# Patient Record
Sex: FEMALE | Race: WHITE | Employment: OTHER | URBAN - METROPOLITAN AREA
[De-identification: names, ages, dates, MRNs, and addresses within clinical notes are randomized per-mention and may not be internally consistent; named-entity substitution may affect disease eponyms.]

---

## 2024-03-19 ENCOUNTER — TELEPHONE (OUTPATIENT)
Dept: FAMILY MEDICINE CLINIC | Facility: CLINIC | Age: 69
End: 2024-03-19

## 2024-03-19 NOTE — TELEPHONE ENCOUNTER
Left hip revision on 04/03/24 with Dr. Aguero at [facility]    H&P- completed 03/20/24  Labs- RDW-SD (48.8), FBS (104), A1C (5.8), MRSA neg, BUN/Creat ratio (20.5),  CRP (1.70), all other labs WNL  EKG- Normal SR, low voltage QRS, consider pulmonary disease, pericardial effusion, or normal variant. Pt had a normal Lexiscan 10/17/22 in Care Everywhere  X-ray- Severe scoliotic curvature dorsal lumbar spine and multilevel spondylosis, otherwise WNL    ASA hold 7 days prior to surgery per JA

## 2024-03-20 ENCOUNTER — OFFICE VISIT (OUTPATIENT)
Dept: FAMILY MEDICINE CLINIC | Facility: CLINIC | Age: 69
End: 2024-03-20
Payer: MEDICARE

## 2024-03-20 ENCOUNTER — HOSPITAL ENCOUNTER (OUTPATIENT)
Dept: GENERAL RADIOLOGY | Facility: HOSPITAL | Age: 69
Discharge: HOME | End: 2024-03-20
Attending: FAMILY MEDICINE
Payer: MEDICARE

## 2024-03-20 ENCOUNTER — LAB ENCOUNTER (OUTPATIENT)
Dept: LAB | Facility: HOSPITAL | Age: 69
End: 2024-03-20
Attending: FAMILY MEDICINE
Payer: MEDICARE

## 2024-03-20 VITALS
SYSTOLIC BLOOD PRESSURE: 114 MMHG | HEIGHT: 65.5 IN | BODY MASS INDEX: 34.83 KG/M2 | TEMPERATURE: 98 F | DIASTOLIC BLOOD PRESSURE: 70 MMHG | RESPIRATION RATE: 16 BRPM | OXYGEN SATURATION: 98 % | HEART RATE: 73 BPM | WEIGHT: 211.63 LBS

## 2024-03-20 DIAGNOSIS — Z01.812 PRE-OPERATIVE LABORATORY EXAMINATION: ICD-10-CM

## 2024-03-20 DIAGNOSIS — Z01.818 PREOPERATIVE EXAMINATION, UNSPECIFIED: ICD-10-CM

## 2024-03-20 DIAGNOSIS — R73.01 ELEVATED FASTING BLOOD SUGAR: ICD-10-CM

## 2024-03-20 DIAGNOSIS — E78.00 HIGH CHOLESTEROL: ICD-10-CM

## 2024-03-20 DIAGNOSIS — Z01.818 PREOPERATIVE EXAMINATION, UNSPECIFIED: Primary | ICD-10-CM

## 2024-03-20 DIAGNOSIS — Z86.73 HISTORY OF CVA (CEREBROVASCULAR ACCIDENT): ICD-10-CM

## 2024-03-20 DIAGNOSIS — T84.011A FAILURE OF LEFT TOTAL HIP ARTHROPLASTY, INITIAL ENCOUNTER (HCC): ICD-10-CM

## 2024-03-20 DIAGNOSIS — K21.00 GASTROESOPHAGEAL REFLUX DISEASE WITH ESOPHAGITIS WITHOUT HEMORRHAGE: ICD-10-CM

## 2024-03-20 DIAGNOSIS — I10 PRIMARY HYPERTENSION: ICD-10-CM

## 2024-03-20 DIAGNOSIS — E66.9 OBESITY (BMI 30.0-34.9): ICD-10-CM

## 2024-03-20 DIAGNOSIS — G47.33 OSA ON CPAP: ICD-10-CM

## 2024-03-20 DIAGNOSIS — R73.03 PREDIABETES: ICD-10-CM

## 2024-03-20 LAB
ALBUMIN SERPL-MCNC: 4 G/DL (ref 3.2–4.8)
ALBUMIN/GLOB SERPL: 1.2 {RATIO} (ref 1–2)
ALP LIVER SERPL-CCNC: 72 U/L
ALT SERPL-CCNC: 24 U/L
ANION GAP SERPL CALC-SCNC: 5 MMOL/L (ref 0–18)
ANTIBODY SCREEN: NEGATIVE
AST SERPL-CCNC: 24 U/L (ref ?–34)
ATRIAL RATE: 64 BPM
BASOPHILS # BLD AUTO: 0.06 X10(3) UL (ref 0–0.2)
BASOPHILS NFR BLD AUTO: 0.9 %
BILIRUB SERPL-MCNC: 0.8 MG/DL (ref 0.2–1.1)
BILIRUB UR QL: NEGATIVE
BUN BLD-MCNC: 15 MG/DL (ref 9–23)
BUN/CREAT SERPL: 20.5 (ref 10–20)
CALCIUM BLD-MCNC: 9.6 MG/DL (ref 8.7–10.4)
CHLORIDE SERPL-SCNC: 107 MMOL/L (ref 98–112)
CLARITY UR: CLEAR
CO2 SERPL-SCNC: 30 MMOL/L (ref 21–32)
COLOR UR: YELLOW
CREAT BLD-MCNC: 0.73 MG/DL
CRP SERPL-MCNC: 1.7 MG/DL (ref ?–1)
DEPRECATED RDW RBC AUTO: 48 FL (ref 35.1–46.3)
EOSINOPHIL # BLD AUTO: 0.2 X10(3) UL (ref 0–0.7)
EOSINOPHIL NFR BLD AUTO: 3 %
ERYTHROCYTE [DISTWIDTH] IN BLOOD BY AUTOMATED COUNT: 13.4 % (ref 11–15)
ERYTHROCYTE [SEDIMENTATION RATE] IN BLOOD: 29 MM/HR
EST. AVERAGE GLUCOSE BLD GHB EST-MCNC: 120 MG/DL (ref 68–126)
FASTING STATUS PATIENT QL REPORTED: YES
GLOBULIN PLAS-MCNC: 3.4 G/DL (ref 2.8–4.4)
GLUCOSE BLD-MCNC: 104 MG/DL (ref 70–99)
GLUCOSE UR-MCNC: NORMAL MG/DL
HBA1C MFR BLD: 5.8 % (ref ?–5.7)
HCT VFR BLD AUTO: 45.8 %
HGB BLD-MCNC: 14.4 G/DL
HGB UR QL STRIP.AUTO: NEGATIVE
IMM GRANULOCYTES # BLD AUTO: 0.01 X10(3) UL (ref 0–1)
IMM GRANULOCYTES NFR BLD: 0.1 %
KETONES UR-MCNC: 10 MG/DL
LEUKOCYTE ESTERASE UR QL STRIP.AUTO: NEGATIVE
LYMPHOCYTES # BLD AUTO: 2.08 X10(3) UL (ref 1–4)
LYMPHOCYTES NFR BLD AUTO: 30.8 %
MCH RBC QN AUTO: 30.5 PG (ref 26–34)
MCHC RBC AUTO-ENTMCNC: 31.4 G/DL (ref 31–37)
MCV RBC AUTO: 97 FL
MONOCYTES # BLD AUTO: 0.47 X10(3) UL (ref 0.1–1)
MONOCYTES NFR BLD AUTO: 7 %
NEUTROPHILS # BLD AUTO: 3.94 X10 (3) UL (ref 1.5–7.7)
NEUTROPHILS # BLD AUTO: 3.94 X10(3) UL (ref 1.5–7.7)
NEUTROPHILS NFR BLD AUTO: 58.2 %
NITRITE UR QL STRIP.AUTO: NEGATIVE
OSMOLALITY SERPL CALC.SUM OF ELEC: 295 MOSM/KG (ref 275–295)
P AXIS: 28 DEGREES
P-R INTERVAL: 162 MS
PH UR: 6 [PH] (ref 5–8)
PLATELET # BLD AUTO: 275 10(3)UL (ref 150–450)
POTASSIUM SERPL-SCNC: 4.6 MMOL/L (ref 3.5–5.1)
PROT SERPL-MCNC: 7.4 G/DL (ref 5.7–8.2)
PROT UR-MCNC: NEGATIVE MG/DL
Q-T INTERVAL: 414 MS
QRS DURATION: 76 MS
QTC CALCULATION (BEZET): 427 MS
R AXIS: -3 DEGREES
RBC # BLD AUTO: 4.72 X10(6)UL
RH BLOOD TYPE: POSITIVE
RH BLOOD TYPE: POSITIVE
SODIUM SERPL-SCNC: 142 MMOL/L (ref 136–145)
SP GR UR STRIP: 1.03 (ref 1–1.03)
T AXIS: 16 DEGREES
UROBILINOGEN UR STRIP-ACNC: NORMAL
VENTRICULAR RATE: 64 BPM
WBC # BLD AUTO: 6.8 X10(3) UL (ref 4–11)

## 2024-03-20 PROCEDURE — 93005 ELECTROCARDIOGRAM TRACING: CPT

## 2024-03-20 PROCEDURE — 86901 BLOOD TYPING SEROLOGIC RH(D): CPT | Performed by: FAMILY MEDICINE

## 2024-03-20 PROCEDURE — 93010 ELECTROCARDIOGRAM REPORT: CPT | Performed by: INTERNAL MEDICINE

## 2024-03-20 PROCEDURE — 85025 COMPLETE CBC W/AUTO DIFF WBC: CPT

## 2024-03-20 PROCEDURE — 80053 COMPREHEN METABOLIC PANEL: CPT

## 2024-03-20 PROCEDURE — 86850 RBC ANTIBODY SCREEN: CPT | Performed by: FAMILY MEDICINE

## 2024-03-20 PROCEDURE — 85652 RBC SED RATE AUTOMATED: CPT

## 2024-03-20 PROCEDURE — 36415 COLL VENOUS BLD VENIPUNCTURE: CPT | Performed by: FAMILY MEDICINE

## 2024-03-20 PROCEDURE — 83036 HEMOGLOBIN GLYCOSYLATED A1C: CPT

## 2024-03-20 PROCEDURE — 81003 URINALYSIS AUTO W/O SCOPE: CPT

## 2024-03-20 PROCEDURE — 71046 X-RAY EXAM CHEST 2 VIEWS: CPT | Performed by: FAMILY MEDICINE

## 2024-03-20 PROCEDURE — 87081 CULTURE SCREEN ONLY: CPT

## 2024-03-20 PROCEDURE — 99203 OFFICE O/P NEW LOW 30 MIN: CPT | Performed by: FAMILY MEDICINE

## 2024-03-20 PROCEDURE — 86900 BLOOD TYPING SEROLOGIC ABO: CPT | Performed by: FAMILY MEDICINE

## 2024-03-20 PROCEDURE — 86140 C-REACTIVE PROTEIN: CPT

## 2024-03-20 RX ORDER — LISINOPRIL 5 MG/1
5 TABLET ORAL DAILY
COMMUNITY

## 2024-03-20 RX ORDER — ASPIRIN 81 MG/1
81 TABLET ORAL DAILY
COMMUNITY

## 2024-03-20 RX ORDER — FAMOTIDINE 40 MG/1
40 TABLET, FILM COATED ORAL DAILY
COMMUNITY

## 2024-03-20 NOTE — H&P (VIEW-ONLY)
[facility] PRE-OP CLINIC [place]    PRE-OP NOTE    HPI:   I have been consulted by Dr. Aguero to see Silvana Owusu 68 year old adult for a preoperative evaluation and medical clearance. 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 She is scheduled for left hip revision with Dr. Aguero on 4/3/24 at [facility].     Pt suffers significant pain and loss of function.     No cardiopulmonary symptoms.      No history of DVT. Denies tobacco use. She does have ARISTEO.       Family History   Problem Relation Age of Onset    Cancer Father         pancreatic    Diabetes Mother     Cancer Mother         small cell lung CA    Cancer Brother         prostate cancer and leukemia    Heart Disorder Brother       Current Outpatient Medications   Medication Sig Dispense Refill    aspirin 81 MG Oral Tab EC Take 1 tablet (81 mg total) by mouth daily.      famotidine 40 MG Oral Tab Take 1 tablet (40 mg total) by mouth daily.      lisinopril 5 MG Oral Tab Take 1 tablet (5 mg total) by mouth daily.       Past Medical History:   Diagnosis Date    Allergic rhinitis     Colon polyps     Congenital deformity of hip (HCC)     CVA (cerebral vascular accident) (HCC) 2009    silent stroke per Dr. Ny    Endometrial cancer (HCC) 2016    stage 1    Endometrial hyperplasia without atypia, complex     Essential hypertension     GERD (gastroesophageal reflux disease)     Hyperlipidemia     IBS (irritable bowel syndrome)     Insomnia     Liver hemangioma     Obesity     ARISTEO (obstructive sleep apnea)     uses CPAP    Osteoarthritis     Pancreatitis (HCC)     Prediabetes     Scoliosis     Sepsis (HCC) 2020    left leg cellulitis    UTI (urinary tract infection)     Visual impairment     wears glasses     Past Surgical History:   Procedure Laterality Date    CHOLECYSTECTOMY      COLONOSCOPY      2011    HERNIA REPAIR  2018    ventral    HIP REPLACEMENT SURGERY Bilateral     bilateral hip replacements in 2000, with bilateral  revision 2007    HYSTERECTOMY  2016    with bilateral salpingo-oophorectomy    OTHER SURGICAL HISTORY  2009    D&C     Social History     Socioeconomic History    Marital status:      Spouse name: Not on file    Number of children: Not on file    Years of education: Not on file    Highest education level: Not on file   Occupational History    Not on file   Tobacco Use    Smoking status: Former     Packs/day: 0.25     Years: 20.00     Additional pack years: 0.00     Total pack years: 5.00     Types: Cigarettes     Quit date:      Years since quittin.2     Passive exposure: Past    Smokeless tobacco: Never   Vaping Use    Vaping Use: Former   Substance and Sexual Activity    Alcohol use: Yes     Comment: rarely    Drug use: Never    Sexual activity: Not on file   Other Topics Concern    Not on file   Social History Narrative    Not on file     Social Determinants of Health     Financial Resource Strain: Not on file   Food Insecurity: Not on file   Transportation Needs: Not on file   Physical Activity: Not on file   Stress: Not on file   Social Connections: Not on file   Housing Stability: Not on file       REVIEW OF SYSTEMS:   CONSTITUTIONAL:  Denies unusual weight gain/loss, fever, chills  EENT:  Eyes:  Denies eye pain, visual loss, blurred vision, double vision. Ears, Nose, Throat:  Denies congestion, runny nose or sore throat.  INTEGUMENTARY:  Denies rashes, itching, skin lesion,   CARDIOVASCULAR:  Denies DVT. Denies chest pain, palpitations, edema, dyspnea  RESPIRATORY: she has ARISTEO on cpap ,Denies shortness of breath, wheezing, cough  GASTROINTESTINAL:  Denies abdominal pain, nausea, vomiting, constipation, diarrhea, or blood in stool.  MUSCULOSKELETAL: severe pain as noted above  NEUROLOGICAL:  Denies headache, seizures, dizziness, syncope, paralysis, ataxia,  HEMATOLOGIC:  Denies anemia, bleeding or bruising.  LYMPHATICS:  Denies enlarged nodes   PSYCHIATRIC:  Denies depression or  anxiety.  ENDOCRINOLOGIC: DM 2 she has prediabetes,   ALLERGIES:  Denies allergic response, history of asthma, hives,     EXAM:   /70 (BP Location: Left arm)   Pulse 73   Temp 97.9 °F (36.6 °C) (Temporal)   Resp 16   Ht 5' 5.5\" (1.664 m)   Wt 211 lb 9.6 oz (96 kg)   SpO2 98%   BMI 34.68 kg/m²  Estimated body mass index is 34.68 kg/m² as calculated from the following:    Height as of this encounter: 5' 5.5\" (1.664 m).    Weight as of this encounter: 211 lb 9.6 oz (96 kg).   Vital signs reviewed.Appears stated age, well groomed.  Physical Exam:  GEN:  Patient is alert, awake and oriented, well developed, well nourished, no apparent distress.  HEENT:  Head:  Normocephalic, atraumatic Eyes: EOMI,no scleral icterus, conjunctivae clear bilaterally, no eye discharge Nose: patent, no nasal discharge  NECK: Supple, no CLAD, no carotid bruit, no thyromegaly.  SKIN: No rashes, no skin lesion, no bruising, good turgor.  HEART:  Regular rate and rhythm, no murmurs, rubs or gallops.  LUNGS: Clear to auscultation bilterally, no rales/rhonchi/wheezing.  CHEST: No tenderness.  ABDOMEN:  Soft, nondistended, nontender,  no masses, no hepatosplenomegaly.  BACK: No tenderness, no spasm,   EXTREMITIES:  No edema, no cyanosis, no clubbing,   NEURO:  No focal deficit, speech fluent, normal gait, strength and tone, sensory intact      Lab Results   Component Value Date    WBC 6.8 03/20/2024    HGB 14.4 03/20/2024    HCT 45.8 03/20/2024    .0 03/20/2024    CREATSERUM 0.73 03/20/2024    BUN 15 03/20/2024     03/20/2024    K 4.6 03/20/2024     03/20/2024    CO2 30.0 03/20/2024     (H) 03/20/2024    CA 9.6 03/20/2024    ALB 4.0 03/20/2024    ALKPHO 72 03/20/2024    BILT 0.8 03/20/2024    TP 7.4 03/20/2024    AST 24 03/20/2024    ALT 24 03/20/2024    ESRML 29 03/20/2024    CRP 1.70 (H) 03/20/2024       XR CHEST PA + LAT CHEST (CPT=71046)    Result Date: 3/20/2024  CONCLUSION:  1. No acute cardiopulmonary  disease.  2. Severe scoliotic curvature dorsal lumbar spine and multilevel spondylosis    Dictated by (CST): Beni Paul MD on 3/20/2024 at 4:56 PM     Finalized by (CST): Beni Paul MD on 3/20/2024 at 4:58 PM           EKG 12 Lead    Result Date: 3/20/2024  Normal sinus rhythm Low voltage QRS, consider pulmonary disease, pericardial effusion, or normal variant Borderline ECG No previous ECGs found in [facility] Confirmed by Naga Coronado ([id number]) on 3/20/2024 4:05:02 PM     ASSESSMENT AND PLAN:   Sivlana Owusu is a 68 year old adult, with a hx of a failed left hip replacement from remote past. She is scheduled for left hip revision with Dr. Aguero on 4/3/24 at [facility].    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     Preoperative Risk Stratification: There are no decompensated medical conditions. ASA classification 2      Patient has an RCRI score that is low risk for major cardiac event in the perioperative period.     Patient is medically optimized and has an acceptable risk of surgery and may proceed with surgery as planned.     PLAN:    Patient to discontinue medications and supplements with anticoagulation properties as per instruction.       Postoperative Recommendations:    Anticoagulation / DVT prophylaxis: SCDs, early ambulation. ASA 81 mg twice daily with food for four weeks.    GI protection: Protonix  Incentive Spirometry   Telemetry as needed  CPAP/O2 as needed **      Pain management and Physical therapy as per Orthopedic service.   Home Health as needed    Thank you for the opportunity to care for your patient and to assist in managing the  postoperative course.        Kelvin Gant MD  3/20/2024  5:41 PM

## 2024-03-20 NOTE — H&P
[facility] PRE-OP CLINIC [place]    PRE-OP NOTE    HPI:   I have been consulted by Dr. Aguero to see Silvana Owusu 68 year old adult for a preoperative evaluation and medical clearance. 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 She is scheduled for left hip revision with Dr. Aguero on 4/3/24 at [facility].     Pt suffers significant pain and loss of function.     No cardiopulmonary symptoms.      No history of DVT. Denies tobacco use. She does have ARISTEO.       Family History   Problem Relation Age of Onset    Cancer Father         pancreatic    Diabetes Mother     Cancer Mother         small cell lung CA    Cancer Brother         prostate cancer and leukemia    Heart Disorder Brother       Current Outpatient Medications   Medication Sig Dispense Refill    aspirin 81 MG Oral Tab EC Take 1 tablet (81 mg total) by mouth daily.      famotidine 40 MG Oral Tab Take 1 tablet (40 mg total) by mouth daily.      lisinopril 5 MG Oral Tab Take 1 tablet (5 mg total) by mouth daily.       Past Medical History:   Diagnosis Date    Allergic rhinitis     Colon polyps     Congenital deformity of hip (HCC)     CVA (cerebral vascular accident) (HCC) 2009    silent stroke per Dr. Ny    Endometrial cancer (HCC) 2016    stage 1    Endometrial hyperplasia without atypia, complex     Essential hypertension     GERD (gastroesophageal reflux disease)     Hyperlipidemia     IBS (irritable bowel syndrome)     Insomnia     Liver hemangioma     Obesity     ARISTEO (obstructive sleep apnea)     uses CPAP    Osteoarthritis     Pancreatitis (HCC)     Prediabetes     Scoliosis     Sepsis (HCC) 2020    left leg cellulitis    UTI (urinary tract infection)     Visual impairment     wears glasses     Past Surgical History:   Procedure Laterality Date    CHOLECYSTECTOMY      COLONOSCOPY      2011    HERNIA REPAIR  2018    ventral    HIP REPLACEMENT SURGERY Bilateral     bilateral hip replacements in 2000, with bilateral  revision 2007    HYSTERECTOMY  2016    with bilateral salpingo-oophorectomy    OTHER SURGICAL HISTORY  2009    D&C     Social History     Socioeconomic History    Marital status:      Spouse name: Not on file    Number of children: Not on file    Years of education: Not on file    Highest education level: Not on file   Occupational History    Not on file   Tobacco Use    Smoking status: Former     Packs/day: 0.25     Years: 20.00     Additional pack years: 0.00     Total pack years: 5.00     Types: Cigarettes     Quit date:      Years since quittin.2     Passive exposure: Past    Smokeless tobacco: Never   Vaping Use    Vaping Use: Former   Substance and Sexual Activity    Alcohol use: Yes     Comment: rarely    Drug use: Never    Sexual activity: Not on file   Other Topics Concern    Not on file   Social History Narrative    Not on file     Social Determinants of Health     Financial Resource Strain: Not on file   Food Insecurity: Not on file   Transportation Needs: Not on file   Physical Activity: Not on file   Stress: Not on file   Social Connections: Not on file   Housing Stability: Not on file       REVIEW OF SYSTEMS:   CONSTITUTIONAL:  Denies unusual weight gain/loss, fever, chills  EENT:  Eyes:  Denies eye pain, visual loss, blurred vision, double vision. Ears, Nose, Throat:  Denies congestion, runny nose or sore throat.  INTEGUMENTARY:  Denies rashes, itching, skin lesion,   CARDIOVASCULAR:  Denies DVT. Denies chest pain, palpitations, edema, dyspnea  RESPIRATORY: she has ARISTEO on cpap ,Denies shortness of breath, wheezing, cough  GASTROINTESTINAL:  Denies abdominal pain, nausea, vomiting, constipation, diarrhea, or blood in stool.  MUSCULOSKELETAL: severe pain as noted above  NEUROLOGICAL:  Denies headache, seizures, dizziness, syncope, paralysis, ataxia,  HEMATOLOGIC:  Denies anemia, bleeding or bruising.  LYMPHATICS:  Denies enlarged nodes   PSYCHIATRIC:  Denies depression or  anxiety.  ENDOCRINOLOGIC: DM 2 she has prediabetes,   ALLERGIES:  Denies allergic response, history of asthma, hives,     EXAM:   /70 (BP Location: Left arm)   Pulse 73   Temp 97.9 °F (36.6 °C) (Temporal)   Resp 16   Ht 5' 5.5\" (1.664 m)   Wt 211 lb 9.6 oz (96 kg)   SpO2 98%   BMI 34.68 kg/m²  Estimated body mass index is 34.68 kg/m² as calculated from the following:    Height as of this encounter: 5' 5.5\" (1.664 m).    Weight as of this encounter: 211 lb 9.6 oz (96 kg).   Vital signs reviewed.Appears stated age, well groomed.  Physical Exam:  GEN:  Patient is alert, awake and oriented, well developed, well nourished, no apparent distress.  HEENT:  Head:  Normocephalic, atraumatic Eyes: EOMI,no scleral icterus, conjunctivae clear bilaterally, no eye discharge Nose: patent, no nasal discharge  NECK: Supple, no CLAD, no carotid bruit, no thyromegaly.  SKIN: No rashes, no skin lesion, no bruising, good turgor.  HEART:  Regular rate and rhythm, no murmurs, rubs or gallops.  LUNGS: Clear to auscultation bilterally, no rales/rhonchi/wheezing.  CHEST: No tenderness.  ABDOMEN:  Soft, nondistended, nontender,  no masses, no hepatosplenomegaly.  BACK: No tenderness, no spasm,   EXTREMITIES:  No edema, no cyanosis, no clubbing,   NEURO:  No focal deficit, speech fluent, normal gait, strength and tone, sensory intact      Lab Results   Component Value Date    WBC 6.8 03/20/2024    HGB 14.4 03/20/2024    HCT 45.8 03/20/2024    .0 03/20/2024    CREATSERUM 0.73 03/20/2024    BUN 15 03/20/2024     03/20/2024    K 4.6 03/20/2024     03/20/2024    CO2 30.0 03/20/2024     (H) 03/20/2024    CA 9.6 03/20/2024    ALB 4.0 03/20/2024    ALKPHO 72 03/20/2024    BILT 0.8 03/20/2024    TP 7.4 03/20/2024    AST 24 03/20/2024    ALT 24 03/20/2024    ESRML 29 03/20/2024    CRP 1.70 (H) 03/20/2024       XR CHEST PA + LAT CHEST (CPT=71046)    Result Date: 3/20/2024  CONCLUSION:  1. No acute cardiopulmonary  disease.  2. 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 She is scheduled for left hip revision with Dr. Aguero on 4/3/24 at [facility].    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     Preoperative Risk Stratification: There are no decompensated medical conditions. ASA classification 2      Patient has an RCRI score that is low risk for major cardiac event in the perioperative period.     Patient is medically optimized and has an acceptable risk of surgery and may proceed with surgery as planned.     PLAN:    Patient to discontinue medications and supplements with anticoagulation properties as per instruction.       Postoperative Recommendations:    Anticoagulation / DVT prophylaxis: SCDs, early ambulation. ASA 81 mg twice daily with food for four weeks.    GI protection: Protonix  Incentive Spirometry   Telemetry as needed  CPAP/O2 as needed **      Pain management and Physical therapy as per Orthopedic service.   Home Health as needed    Thank you for the opportunity to care for your patient and to assist in managing the  postoperative course.        Kelvin Gant MD  3/20/2024  5:41 PM

## 2024-03-20 NOTE — TELEPHONE ENCOUNTER
Dr. Gant, please review:    Labs- RDW-SD (48.8), FBS (104), A1C (5.8), MRSA neg, BUN/Creat ratio (20.5),  CRP (1.70), all other labs WNL    EKG- Normal SR, low voltage QRS, consider pulmonary disease, pericardial effusion, or normal variant. Pt had a normal Lexiscan 10/17/22 in Care Everywhere    X-ray- Severe scoliotic curvature dorsal lumbar spine and multilevel spondylosis, otherwise WNL    ASA hold 7 days prior to surgery per JA    OK for surgery?

## 2024-03-22 NOTE — TELEPHONE ENCOUNTER
Spoke to patient, went over all test results and let her know she is clear for surgery per Dr. Gant, she will make sure she holds ASA 7 days prior to surgery.

## 2024-03-22 NOTE — TELEPHONE ENCOUNTER
Chart and results reviewed and are acceptable for surgery. Pt is medically clear to proceed with surgery as planned.

## 2024-03-22 NOTE — DISCHARGE INSTRUCTIONS
Frequently Asked Questions after  Total Hip/Knee Arthroplasty  Dr. Pepe Aguero- Surgeon  Conchis Whittington- Physician Assistant  Bianka Mccartney- Physician Assistant  Ira Gomez- Registered Orthopedic Nurse      When should I follow up with Dr. Aguero’s team?  You should follow up with Dr. Aguero/ his Physician Assistants approximately 2-3 weeks and 6 weeks after your surgery.  These appointments should have been made at the time you scheduled your surgery, and the dates/times should be in your orange [facility] information booklet.  If you aren’t sure about this date, please call our office [phone].    Is swelling normal?  YES! We anticipate swelling, but this can be managed well with ice and elevation.  Please ice/elevate 4 times per day for 30 minutes at a minimum. When elevating please make sure the surgical leg is higher than the heart. A good way to do this is to lay completely flat and put the leg on an arm rest of your couch with a few pillows under the ankle. Please make sure the leg is straight when elevating.   If you are short of breath or have calf pain please call us or go to the ER before elevating the leg.    How should I take care of my incision and dressing?  The Aquacel Ag dressing must be removed after 7 days unless saturated before then. Please review instructions on proper removal of the dressing.  If dressing becomes saturated before the 7 day rio, please remove the Aquacel Ag dressing sooner.  Non-stapled incisions: the incision may be left open to air if there is no drainage after the Aquacel Ag is removed. If there is drainage present, please keep the incision covered with and ABD pad changed daily.  Stapled incisions: once the Aquacel Ag dressing is removed, replaced with an ABD pad. Change daily.    How long am I going to be on a blood thinner?  You will be on a blood thinner (aspirin, warfarin, rivaroxaban) for one month from your surgical date to prevent blood clots.  If you  were taking a blood thinner prior to surgery, you will continue this per the recommendation of the prescribing doctor.    How often should I go to physical therapy after surgery?  If you are going directly to outpatient physical therapy:  Knee replacement- 5x/ week the 1st week, 3x/ week thereafter.  Hip replacement- 3x/ week  If you have home health care, this will be for a total of two weeks  Knee replacement- 5x the 1st week, 3x the 2nd week.  Hip replacement- 3x/ week for 2 weeks.    How long should I be taking my medications?  Continue to take your blood thinner (ie aspirin, coumadin/ warfarin, lovenox) and pantoprazole for 1 month from surgery.  If you were prescribed an anti-inflammatory medication (ibuprofen, meloxicam, naproxen etc.), please continue to take this while on your blood thinner. Your pharmacist may instruct you differently. You will be on this for at least 6 weeks.  Continue your stool softener (colace) as long as your are taking narcotic pain medication.    What are the signs of infection I should look for?  It is common for the knee and hip to be red and warm after surgery  Our suspicion for infection rises with any of the followin.  There is pus like discharge from the incision  2.  Your temperature is over 101 degrees  3.  It is painful to move the leg through a gentle range of motion  IF ANY OF THESE THREE OCCUR PLEASE CONTACT OUR OFFICE IMMEDIATELY or if it is after hours, please dial “zero” to talk to the physician on call when you hear the voicemail.      When can I drive?  You can drive when you are off all narcotic pain medications (including Norco, Dilaudid, Oxycontin, oyxcodone).  You must also feel that you are capable of driving safely; meaning you can push on the gas and brake with force if needed.    When can I shower?  The Aquacel Ag dressing is “waterproof”. You may shower with it in place. After the Aquacel Ag dressing has been removed, the incision my get wet in the  shower (if you do NOT have staples). Pat dry with a towel when done.  If you have staples, please use Saran Wrap over the surgical wound when showering. It should NOT get wet while staples are in place.  When can I go in a hot tub/bath/pool?  6 weeks from your surgery if you incision has COMPLETELY healed (no scabs or open areas)      How do I get a refill on my pain medications if necessary before my next appointment?  Oxycodone and Norco (hydrocodone- acetaminophen) require an electronic or physical prescription. Please phone the office at [phone] to arrange for  of a hard copy or to have one mailed to you at least 3-5 days before you will run out of medication. Many large chain pharmacies will accept an electronic prescription.  Please understand that the requests will be taken care of as soon as possible but if it is the weekend they may not be refilled until Monday. Please allow 24- 48 hours on refill requests.    How do I get a handicap placard?  We are happy to provide you with ONE handicap placard at the time of your surgery which will be good for six months.  Please ask our office at your pre-op or post-op appointment if you feel you will need a placard.  If you feel you need a handicap placard for longer than 6 months from surgery please contact your primary care physician.    If you have any questions related to medical issues unrelated to your knee or hip, please contact the physician that cleared you for surgery:  Dr. Mojica [facility] [phone]     If you have questions pertaining to the surgery, please feel free to contact our office:   Phone # [phone]  Fax # [phone]    If you have arranged for outpatient physical therapy upon your discharge from the hospital, you will need to remove the Aquacel surgical dressing on post operative day 7. You should have received instructions on how to properly do this before you left the hospital.     Ice/elevate:  Please ice/elevate 4  times per day for 30 minutes at a minimum.  When elevating please make sure the surgical leg is higher than the heart.  A good way to do this is to lay completely flat and put the leg on an arm rest of your couch with a few pillows under the ankle.  Please make sure the leg is straight when elevating.  If you are short of breath or have calf pain please call us or go to the ER before elevating the leg.      Orthopaedic Discharge Bowel Program while on Pain Medications (Opiods)    Docusate sodium (Colace) 100 mg after breakfast and at bedtime.  Polyethylene glycol (Glycolax or Miralax)17 Gm Daily.  Mix well in glass of water.  Hold both docusate sodium (Colace) and polyethylene glycol (Miralax) if greater than three stools per day.  If no stool after two days at home take senna (Senakot) 1-2 tablets at bedtime.  Repeat nightly untill stool occurs.      Continue program as long as continuing opioids.  Dressing change instructions:  The Aquacel dressing is to be removed on post-op day 7, unless the dressing is not adhering properly--in which case the dressing is to be changed sooner.  To remove the dressing, see instructions below.        Aquacel Removal  Press down on the skin with one hand and carefully lift an edge of the dressing with your other hand, then stretch the dressing to break the adhesive seal.  Stretching releases the adhesive and will allow the dressing to come off cleanly and easily, without damage to the skin.  Wound Care:  Non-stapled incisions: once the Aquacel dressing is removed, leave the incision open to air. If there is drainage, cover with an ABD pad. Change daily.  Stapled incisions: once the Aquacel dressing is removed, keep covered with an ABD pad. Change daily.  Do NOT cleanse the incision  Do NOT apply any ointments/creams/lotions for at least the first 6 weeks after surgery     This includes, but is not limited to, triple-antibiotic/Neosporin/Vitamin E/etc.    Shower instructions:  The  Aquacel Ag dressing is “waterproof.”  You may shower with it in place.    Non-stapled (glued) incisions: the incision may get wet in the shower after the Aquacel Ag has been removed. If you are experiencing incisional drainage, please wrap the your surgical wound in Saran Wrap when showering.  Stapled incisions: the incision may NOT get wet in the shower after the Aquacel Ag has been removed. Please wrap the your surgical wound in Saran Wrap when showering.    Call your surgeon’s office if:  The dressing will not stay in place  If there is any skin issue such as tearing or blistering  More than 50% of the dressing becomes saturated  There is a large amount of fluid coming from the incision  You experience unusual pain or odor

## 2024-03-22 NOTE — DISCHARGE SUMMARY
Ortho Discharge Summary     Patient ID:  Silvana Owusu  [id number]  68 year old  11/3/1955      Admit Date: (Not on file)    Discharge Date and Time: ***    Attending Physician: Pepe Aguero MD     Reason for admission: L hip revision     Discharge Diagnoses: Mechanical complications    Discharged Condition: stable    Hospital Course:  Routine  Medical course per internist     Consults: IP CONSULT TO CASE MANAGEMENT  IP CONSULT TO FAMILY/INTERNAL MED  IP CONSULT TO SOCIAL WORK  CONSULT  - HOME HEALTH    Procedures: L hip revision     Disposition: home    Patient Instructions:     Activity: activity as tolerated and no driving for 2 weeks    Diet: regular diet     Wound Care:Aquacel Ag x 7 days then open to air if no drainage.   Glued incisions may get wet in the shower if no drainage present.   Stapled/ draining incisions must be covered in Saran wrap when showering.    Weight bearing status: touch down weight bearing / no active abduction     Follow-up with Dr. Aguero in 2-3 weeks.    DVT ppx: Aspirin

## 2024-04-03 ENCOUNTER — ANESTHESIA (OUTPATIENT)
Dept: SURGERY | Facility: HOSPITAL | Age: 69
End: 2024-04-03
Payer: MEDICARE

## 2024-04-03 ENCOUNTER — HOSPITAL ENCOUNTER (INPATIENT)
Facility: HOSPITAL | Age: 69
LOS: 6 days | Discharge: NURSING, REHAB OR LONG-TERM CARE | End: 2024-04-09
Attending: ORTHOPAEDIC SURGERY | Admitting: ORTHOPAEDIC SURGERY
Payer: MEDICARE

## 2024-04-03 ENCOUNTER — APPOINTMENT (OUTPATIENT)
Dept: GENERAL RADIOLOGY | Facility: HOSPITAL | Age: 69
End: 2024-04-03
Attending: PHYSICIAN ASSISTANT
Payer: MEDICARE

## 2024-04-03 ENCOUNTER — ANESTHESIA EVENT (OUTPATIENT)
Dept: SURGERY | Facility: HOSPITAL | Age: 69
End: 2024-04-03
Payer: MEDICARE

## 2024-04-03 DIAGNOSIS — T84.011A FAILURE OF LEFT TOTAL HIP ARTHROPLASTY, INITIAL ENCOUNTER (HCC): ICD-10-CM

## 2024-04-03 DIAGNOSIS — Z01.818 PRE-OP TESTING: Primary | ICD-10-CM

## 2024-04-03 LAB
BASOPHILS NFR SNV: 0 %
EOSINOPHIL NFR SNV: 1 %
LYMPHOCYTES NFR SNV: 11 %
MONOS+MACROS NFR SNV: 25 %
NEUTROPHILS NFR FLD: 63 %
RBC # FLD AUTO: ABNORMAL /CUMM (ref ?–1)
TOTAL CELLS COUNTED FLD: 100
TOTAL CELLS COUNTED SNV: 773 /CUMM (ref 0–200)
WBC # SNV: 773 /CUMM

## 2024-04-03 PROCEDURE — 0SRB04A REPLACEMENT OF LEFT HIP JOINT WITH CERAMIC ON POLYETHYLENE SYNTHETIC SUBSTITUTE, UNCEMENTED, OPEN APPROACH: ICD-10-PCS | Performed by: ORTHOPAEDIC SURGERY

## 2024-04-03 PROCEDURE — 88331 PATH CONSLTJ SURG 1 BLK 1SPC: CPT | Performed by: ORTHOPAEDIC SURGERY

## 2024-04-03 PROCEDURE — 89051 BODY FLUID CELL COUNT: CPT | Performed by: ORTHOPAEDIC SURGERY

## 2024-04-03 PROCEDURE — 89050 BODY FLUID CELL COUNT: CPT | Performed by: ORTHOPAEDIC SURGERY

## 2024-04-03 PROCEDURE — 87070 CULTURE OTHR SPECIMN AEROBIC: CPT | Performed by: ORTHOPAEDIC SURGERY

## 2024-04-03 PROCEDURE — 0SPB0JZ REMOVAL OF SYNTHETIC SUBSTITUTE FROM LEFT HIP JOINT, OPEN APPROACH: ICD-10-PCS | Performed by: ORTHOPAEDIC SURGERY

## 2024-04-03 PROCEDURE — 73502 X-RAY EXAM HIP UNI 2-3 VIEWS: CPT | Performed by: PHYSICIAN ASSISTANT

## 2024-04-03 PROCEDURE — 87205 SMEAR GRAM STAIN: CPT | Performed by: ORTHOPAEDIC SURGERY

## 2024-04-03 PROCEDURE — 87075 CULTR BACTERIA EXCEPT BLOOD: CPT | Performed by: ORTHOPAEDIC SURGERY

## 2024-04-03 PROCEDURE — 87176 TISSUE HOMOGENIZATION CULTR: CPT | Performed by: ORTHOPAEDIC SURGERY

## 2024-04-03 PROCEDURE — 88300 SURGICAL PATH GROSS: CPT | Performed by: ORTHOPAEDIC SURGERY

## 2024-04-03 PROCEDURE — 88305 TISSUE EXAM BY PATHOLOGIST: CPT | Performed by: ORTHOPAEDIC SURGERY

## 2024-04-03 DEVICE — BIOLOX DELTA CERAMIC FEMORAL HEAD DIAMETER 28MM +6 11/13 TAPER
Type: IMPLANTABLE DEVICE | Site: HIP | Status: FUNCTIONAL
Brand: BIOLOX DELTA

## 2024-04-03 DEVICE — IMPLANTABLE DEVICE: Type: IMPLANTABLE DEVICE | Status: FUNCTIONAL

## 2024-04-03 RX ORDER — CEFAZOLIN SODIUM/WATER 2 G/20 ML
2 SYRINGE (ML) INTRAVENOUS EVERY 8 HOURS
Qty: 40 ML | Refills: 0 | Status: COMPLETED | OUTPATIENT
Start: 2024-04-04 | End: 2024-04-04

## 2024-04-03 RX ORDER — TRANEXAMIC ACID 10 MG/ML
INJECTION, SOLUTION INTRAVENOUS AS NEEDED
Status: DISCONTINUED | OUTPATIENT
Start: 2024-04-03 | End: 2024-04-03 | Stop reason: SURG

## 2024-04-03 RX ORDER — ONDANSETRON 2 MG/ML
4 INJECTION INTRAMUSCULAR; INTRAVENOUS EVERY 6 HOURS PRN
Status: DISCONTINUED | OUTPATIENT
Start: 2024-04-03 | End: 2024-04-09

## 2024-04-03 RX ORDER — FAMOTIDINE 10 MG/ML
20 INJECTION, SOLUTION INTRAVENOUS 2 TIMES DAILY
Status: DISCONTINUED | OUTPATIENT
Start: 2024-04-03 | End: 2024-04-09

## 2024-04-03 RX ORDER — ENEMA 19; 7 G/133ML; G/133ML
1 ENEMA RECTAL ONCE AS NEEDED
Status: DISCONTINUED | OUTPATIENT
Start: 2024-04-03 | End: 2024-04-09

## 2024-04-03 RX ORDER — NALOXONE HYDROCHLORIDE 0.4 MG/ML
80 INJECTION, SOLUTION INTRAMUSCULAR; INTRAVENOUS; SUBCUTANEOUS AS NEEDED
Status: DISCONTINUED | OUTPATIENT
Start: 2024-04-03 | End: 2024-04-03 | Stop reason: HOSPADM

## 2024-04-03 RX ORDER — ONDANSETRON 2 MG/ML
4 INJECTION INTRAMUSCULAR; INTRAVENOUS EVERY 6 HOURS PRN
Status: DISCONTINUED | OUTPATIENT
Start: 2024-04-03 | End: 2024-04-03 | Stop reason: HOSPADM

## 2024-04-03 RX ORDER — LISINOPRIL 5 MG/1
5 TABLET ORAL DAILY
Status: DISCONTINUED | OUTPATIENT
Start: 2024-04-04 | End: 2024-04-09

## 2024-04-03 RX ORDER — TRAMADOL HYDROCHLORIDE 50 MG/1
TABLET ORAL EVERY 6 HOURS PRN
Status: DISCONTINUED | OUTPATIENT
Start: 2024-04-03 | End: 2024-04-09

## 2024-04-03 RX ORDER — METOCLOPRAMIDE 10 MG/1
10 TABLET ORAL ONCE
Status: COMPLETED | OUTPATIENT
Start: 2024-04-03 | End: 2024-04-03

## 2024-04-03 RX ORDER — ASPIRIN 81 MG/1
81 TABLET ORAL 2 TIMES DAILY
Status: DISCONTINUED | OUTPATIENT
Start: 2024-04-03 | End: 2024-04-09

## 2024-04-03 RX ORDER — SODIUM CHLORIDE, SODIUM LACTATE, POTASSIUM CHLORIDE, CALCIUM CHLORIDE 600; 310; 30; 20 MG/100ML; MG/100ML; MG/100ML; MG/100ML
INJECTION, SOLUTION INTRAVENOUS CONTINUOUS PRN
Status: DISCONTINUED | OUTPATIENT
Start: 2024-04-03 | End: 2024-04-03 | Stop reason: SURG

## 2024-04-03 RX ORDER — BISACODYL 10 MG
10 SUPPOSITORY, RECTAL RECTAL
Status: DISCONTINUED | OUTPATIENT
Start: 2024-04-03 | End: 2024-04-09

## 2024-04-03 RX ORDER — DIPHENHYDRAMINE HCL 25 MG
25 CAPSULE ORAL EVERY 4 HOURS PRN
Status: DISCONTINUED | OUTPATIENT
Start: 2024-04-03 | End: 2024-04-09

## 2024-04-03 RX ORDER — HYDROMORPHONE HYDROCHLORIDE 1 MG/ML
0.6 INJECTION, SOLUTION INTRAMUSCULAR; INTRAVENOUS; SUBCUTANEOUS EVERY 5 MIN PRN
Status: DISCONTINUED | OUTPATIENT
Start: 2024-04-03 | End: 2024-04-03 | Stop reason: HOSPADM

## 2024-04-03 RX ORDER — METOCLOPRAMIDE HYDROCHLORIDE 5 MG/ML
10 INJECTION INTRAMUSCULAR; INTRAVENOUS ONCE
Status: COMPLETED | OUTPATIENT
Start: 2024-04-03 | End: 2024-04-03

## 2024-04-03 RX ORDER — HYDROMORPHONE HYDROCHLORIDE 1 MG/ML
0.4 INJECTION, SOLUTION INTRAMUSCULAR; INTRAVENOUS; SUBCUTANEOUS EVERY 5 MIN PRN
Status: DISCONTINUED | OUTPATIENT
Start: 2024-04-03 | End: 2024-04-03 | Stop reason: HOSPADM

## 2024-04-03 RX ORDER — SODIUM CHLORIDE, SODIUM LACTATE, POTASSIUM CHLORIDE, CALCIUM CHLORIDE 600; 310; 30; 20 MG/100ML; MG/100ML; MG/100ML; MG/100ML
INJECTION, SOLUTION INTRAVENOUS CONTINUOUS
Status: DISCONTINUED | OUTPATIENT
Start: 2024-04-03 | End: 2024-04-03 | Stop reason: HOSPADM

## 2024-04-03 RX ORDER — LIDOCAINE HYDROCHLORIDE 10 MG/ML
INJECTION, SOLUTION EPIDURAL; INFILTRATION; INTRACAUDAL; PERINEURAL AS NEEDED
Status: DISCONTINUED | OUTPATIENT
Start: 2024-04-03 | End: 2024-04-03 | Stop reason: SURG

## 2024-04-03 RX ORDER — FAMOTIDINE 10 MG/ML
20 INJECTION, SOLUTION INTRAVENOUS ONCE
Status: COMPLETED | OUTPATIENT
Start: 2024-04-03 | End: 2024-04-03

## 2024-04-03 RX ORDER — FAMOTIDINE 20 MG/1
20 TABLET, FILM COATED ORAL 2 TIMES DAILY
Status: DISCONTINUED | OUTPATIENT
Start: 2024-04-03 | End: 2024-04-09

## 2024-04-03 RX ORDER — METOCLOPRAMIDE HYDROCHLORIDE 5 MG/ML
10 INJECTION INTRAMUSCULAR; INTRAVENOUS EVERY 8 HOURS PRN
Status: DISCONTINUED | OUTPATIENT
Start: 2024-04-03 | End: 2024-04-03 | Stop reason: HOSPADM

## 2024-04-03 RX ORDER — HYDROMORPHONE HYDROCHLORIDE 1 MG/ML
0.2 INJECTION, SOLUTION INTRAMUSCULAR; INTRAVENOUS; SUBCUTANEOUS EVERY 5 MIN PRN
Status: DISCONTINUED | OUTPATIENT
Start: 2024-04-03 | End: 2024-04-03 | Stop reason: HOSPADM

## 2024-04-03 RX ORDER — FAMOTIDINE 20 MG/1
20 TABLET, FILM COATED ORAL ONCE
Status: COMPLETED | OUTPATIENT
Start: 2024-04-03 | End: 2024-04-03

## 2024-04-03 RX ORDER — DIPHENHYDRAMINE HYDROCHLORIDE 50 MG/ML
25 INJECTION INTRAMUSCULAR; INTRAVENOUS ONCE AS NEEDED
Status: ACTIVE | OUTPATIENT
Start: 2024-04-03 | End: 2024-04-03

## 2024-04-03 RX ORDER — METOCLOPRAMIDE HYDROCHLORIDE 5 MG/ML
10 INJECTION INTRAMUSCULAR; INTRAVENOUS EVERY 8 HOURS PRN
Status: DISCONTINUED | OUTPATIENT
Start: 2024-04-03 | End: 2024-04-09

## 2024-04-03 RX ORDER — DEXAMETHASONE SODIUM PHOSPHATE 4 MG/ML
VIAL (ML) INJECTION AS NEEDED
Status: DISCONTINUED | OUTPATIENT
Start: 2024-04-03 | End: 2024-04-03 | Stop reason: SURG

## 2024-04-03 RX ORDER — ACETAMINOPHEN 500 MG
1000 TABLET ORAL ONCE
Status: COMPLETED | OUTPATIENT
Start: 2024-04-03 | End: 2024-04-03

## 2024-04-03 RX ORDER — ACETAMINOPHEN 325 MG/1
650 TABLET ORAL EVERY 6 HOURS SCHEDULED
Status: DISCONTINUED | OUTPATIENT
Start: 2024-04-03 | End: 2024-04-09

## 2024-04-03 RX ORDER — EPHEDRINE SULFATE 50 MG/ML
INJECTION, SOLUTION INTRAVENOUS AS NEEDED
Status: DISCONTINUED | OUTPATIENT
Start: 2024-04-03 | End: 2024-04-03 | Stop reason: SURG

## 2024-04-03 RX ORDER — ONDANSETRON 2 MG/ML
INJECTION INTRAMUSCULAR; INTRAVENOUS AS NEEDED
Status: DISCONTINUED | OUTPATIENT
Start: 2024-04-03 | End: 2024-04-03 | Stop reason: SURG

## 2024-04-03 RX ORDER — POLYETHYLENE GLYCOL 3350 17 G/17G
17 POWDER, FOR SOLUTION ORAL DAILY PRN
Status: DISCONTINUED | OUTPATIENT
Start: 2024-04-03 | End: 2024-04-09

## 2024-04-03 RX ORDER — DIPHENHYDRAMINE HYDROCHLORIDE 50 MG/ML
12.5 INJECTION INTRAMUSCULAR; INTRAVENOUS EVERY 4 HOURS PRN
Status: DISCONTINUED | OUTPATIENT
Start: 2024-04-03 | End: 2024-04-09

## 2024-04-03 RX ORDER — SENNOSIDES 8.6 MG
17.2 TABLET ORAL NIGHTLY
Status: DISCONTINUED | OUTPATIENT
Start: 2024-04-03 | End: 2024-04-09

## 2024-04-03 RX ORDER — CEFAZOLIN SODIUM/WATER 2 G/20 ML
2 SYRINGE (ML) INTRAVENOUS ONCE
Status: COMPLETED | OUTPATIENT
Start: 2024-04-03 | End: 2024-04-03

## 2024-04-03 RX ORDER — ROCURONIUM BROMIDE 10 MG/ML
INJECTION, SOLUTION INTRAVENOUS AS NEEDED
Status: DISCONTINUED | OUTPATIENT
Start: 2024-04-03 | End: 2024-04-03 | Stop reason: SURG

## 2024-04-03 RX ORDER — TRANEXAMIC ACID 10 MG/ML
1000 INJECTION, SOLUTION INTRAVENOUS ONCE
Status: COMPLETED | OUTPATIENT
Start: 2024-04-04 | End: 2024-04-03

## 2024-04-03 RX ORDER — HYDROMORPHONE HYDROCHLORIDE 2 MG/1
TABLET ORAL EVERY 4 HOURS PRN
Status: DISCONTINUED | OUTPATIENT
Start: 2024-04-03 | End: 2024-04-09

## 2024-04-03 RX ORDER — GLYCOPYRROLATE 0.2 MG/ML
INJECTION, SOLUTION INTRAMUSCULAR; INTRAVENOUS AS NEEDED
Status: DISCONTINUED | OUTPATIENT
Start: 2024-04-03 | End: 2024-04-03 | Stop reason: SURG

## 2024-04-03 RX ORDER — DOCUSATE SODIUM 100 MG/1
100 CAPSULE, LIQUID FILLED ORAL 2 TIMES DAILY
Status: DISCONTINUED | OUTPATIENT
Start: 2024-04-03 | End: 2024-04-09

## 2024-04-03 RX ORDER — SODIUM CHLORIDE, SODIUM LACTATE, POTASSIUM CHLORIDE, CALCIUM CHLORIDE 600; 310; 30; 20 MG/100ML; MG/100ML; MG/100ML; MG/100ML
INJECTION, SOLUTION INTRAVENOUS CONTINUOUS
Status: DISCONTINUED | OUTPATIENT
Start: 2024-04-03 | End: 2024-04-09

## 2024-04-03 RX ADMIN — SODIUM CHLORIDE, SODIUM LACTATE, POTASSIUM CHLORIDE, CALCIUM CHLORIDE: 600; 310; 30; 20 INJECTION, SOLUTION INTRAVENOUS at 18:38:00

## 2024-04-03 RX ADMIN — LIDOCAINE HYDROCHLORIDE 50 MG: 10 INJECTION, SOLUTION EPIDURAL; INFILTRATION; INTRACAUDAL; PERINEURAL at 16:01:00

## 2024-04-03 RX ADMIN — EPHEDRINE SULFATE 5 MG: 50 INJECTION, SOLUTION INTRAVENOUS at 16:12:00

## 2024-04-03 RX ADMIN — CEFAZOLIN SODIUM/WATER 2 G: 2 G/20 ML SYRINGE (ML) INTRAVENOUS at 16:06:00

## 2024-04-03 RX ADMIN — ROCURONIUM BROMIDE 30 MG: 10 INJECTION, SOLUTION INTRAVENOUS at 16:06:00

## 2024-04-03 RX ADMIN — SODIUM CHLORIDE, SODIUM LACTATE, POTASSIUM CHLORIDE, CALCIUM CHLORIDE: 600; 310; 30; 20 INJECTION, SOLUTION INTRAVENOUS at 16:43:00

## 2024-04-03 RX ADMIN — ONDANSETRON 4 MG: 2 INJECTION INTRAMUSCULAR; INTRAVENOUS at 17:57:00

## 2024-04-03 RX ADMIN — SODIUM CHLORIDE, SODIUM LACTATE, POTASSIUM CHLORIDE, CALCIUM CHLORIDE: 600; 310; 30; 20 INJECTION, SOLUTION INTRAVENOUS at 15:56:00

## 2024-04-03 RX ADMIN — EPHEDRINE SULFATE 10 MG: 50 INJECTION, SOLUTION INTRAVENOUS at 18:03:00

## 2024-04-03 RX ADMIN — DEXAMETHASONE SODIUM PHOSPHATE 4 MG: 4 MG/ML VIAL (ML) INJECTION at 17:57:00

## 2024-04-03 RX ADMIN — EPHEDRINE SULFATE 10 MG: 50 INJECTION, SOLUTION INTRAVENOUS at 17:33:00

## 2024-04-03 RX ADMIN — SODIUM CHLORIDE, SODIUM LACTATE, POTASSIUM CHLORIDE, CALCIUM CHLORIDE: 600; 310; 30; 20 INJECTION, SOLUTION INTRAVENOUS at 16:11:00

## 2024-04-03 RX ADMIN — GLYCOPYRROLATE 0.2 MG: 0.2 INJECTION, SOLUTION INTRAMUSCULAR; INTRAVENOUS at 15:56:00

## 2024-04-03 RX ADMIN — ROCURONIUM BROMIDE 2 MG: 10 INJECTION, SOLUTION INTRAVENOUS at 15:56:00

## 2024-04-03 RX ADMIN — ROCURONIUM BROMIDE 10 MG: 10 INJECTION, SOLUTION INTRAVENOUS at 16:33:00

## 2024-04-03 RX ADMIN — TRANEXAMIC ACID 1000 MG: 10 INJECTION, SOLUTION INTRAVENOUS at 16:17:00

## 2024-04-03 NOTE — ANESTHESIA PROCEDURE NOTES
Peripheral IV  Date/Time: 4/3/2024 4:10 PM  Inserted by: Gentry Brownlee MD    Placement  Needle size: 20 G  Laterality: left  Location: hand  Local anesthetic: none  Site prep: alcohol  Technique: anatomical landmarks  Attempts: 2

## 2024-04-03 NOTE — BRIEF OP NOTE
Pre-Operative Diagnosis: Mechanical complications     Post-Operative Diagnosis: Mechanical complications      Procedure Performed:   Left hip revision    Surgeon(s) and Role:     * Pepe Aguero MD - Primary     * Cyril Hunt MD - Assisting Surgeon    Assistant(s):  Surgical Assistant.: Mitchel Jolley     Surgical Findings: see dictation     Specimen: See dictation     Estimated Blood Loss: 150cc      Cyril Hunt MD  4/3/2024  6:27 PM

## 2024-04-03 NOTE — ANESTHESIA POSTPROCEDURE EVALUATION
Patient: Silvana Owusu    Procedure Summary       Date: 04/03/24 Room / Location: [facility] MAIN OR [address] [place] [address] MAIN OR    Anesthesia Start: 1555 Anesthesia Stop:     Procedure: Left hip revision (Left) Diagnosis: (Mechanical complications)    Surgeons: Pepe Aguero MD Anesthesiologist: Quintin Iqbal MD    Anesthesia Type: general ASA Status: 3            Anesthesia Type: general    Vitals Value Taken Time   /66 04/03/24 1839   Temp 97.5 °F (36.4 °C) 04/03/24 1839   Pulse 92 04/03/24 1839   Resp 18 04/03/24 1839   SpO2 97 % 04/03/24 1839       [facility] AN Post Evaluation:   Patient Evaluated in PACU  Patient Participation: complete - patient participated  Level of Consciousness: awake  Pain Management: adequate  Airway Patency:patent  Dental exam unchanged from preop  Yes    Cardiovascular Status: acceptable  Respiratory Status: acceptable  Postoperative Hydration acceptable      QUINTIN QIBAL MD  4/3/2024 6:39 PM

## 2024-04-03 NOTE — ANESTHESIA PREPROCEDURE EVALUATION
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 Ny    Endometrial cancer (HCC) 2016    stage 1    Endometrial hyperplasia without atypia, complex     Essential hypertension     GERD (gastroesophageal reflux disease)     Hyperlipidemia     IBS (irritable bowel syndrome)     Insomnia     Liver hemangioma     Obesity     ARISTEO (obstructive sleep apnea)     uses CPAP    Osteoarthritis     Pancreatitis (HCC)     Prediabetes     Scoliosis     Scoliosis     Sepsis (HCC) 2020    left leg cellulitis    Sleep apnea     Stroke (HCC) 2009    UTI (urinary tract infection)     Visual impairment     wears glasses       Past Surgical History:   Procedure Laterality Date    CHOLECYSTECTOMY      COLONOSCOPY      2011    HERNIA REPAIR  2018    ventral    HIP REPLACEMENT SURGERY Bilateral     bilateral hip replacements in 2000, with bilateral revision 2007    HYSTERECTOMY  2016    with bilateral  salpingo-oophorectomy    OTHER SURGICAL HISTORY  2009    D&C       Medications Prior to Admission   Medication Sig Dispense Refill Last Dose    aspirin 81 MG Oral Tab EC Take 1 tablet (81 mg total) by mouth daily.   3/26/2024    famotidine 40 MG Oral Tab Take 1 tablet (40 mg total) by mouth daily.   4/3/2024 at 0430    lisinopril 5 MG Oral Tab Take 1 tablet (5 mg total) by mouth daily.   2024     Current Facility-Administered Medications Ordered in Epic   Medication Dose Route Frequency Provider Last Rate Last Admin    lactated ringers infusion   Intravenous Continuous Pepe Aguero MD 20 mL/hr at 24 1446 New Bag at 24 1446    ceFAZolin (Ancef) 2 g in 20mL IV syringe premix  2 g Intravenous Once Pepe Aguero MD        clonidine-EPINEPHrine-ropivacaine (CERs) (Duraclon-Adrenalin-Naropin) pain cocktain irrigation (for NSAId allergic patients)   Intra-articular Once (Intra-Op) Pepe Aguero MD         No current [facility]-ordered outpatient medications on file.       Allergies   Allergen Reactions    Morphine HYPOTENSION and OTHER (SEE COMMENTS)     Blood pressure dropped very low, pt was unable to sit up, stand up until morphine was discontinued    \"low blood pressure\" denies hives    Naproxen OTHER (SEE COMMENTS) and SWELLING     Hands swell, pt sts she is able to take celebrex    Hand swelling.    Swelling in hands    Hand swelling    Radiology Contrast Iodinated Dyes HIVES and ITCHING       Family History   Problem Relation Age of Onset    Cancer Father         pancreatic    Diabetes Mother     Cancer Mother         small cell lung CA    Cancer Brother         prostate cancer and leukemia    Heart Disorder Brother      Social History     Socioeconomic History    Marital status:    Tobacco Use    Smoking status: Former     Packs/day: 0.25     Years: 20.00     Additional pack years: 0.00     Total pack years: 5.00     Types: Cigarettes     Quit date:      Years since quittin.2      Passive exposure: Past    Smokeless tobacco: Never   Vaping Use    Vaping Use: Former   Substance and Sexual Activity    Alcohol use: Yes     Comment: monthly    Drug use: Never       Available pre-op labs reviewed.  Lab Results   Component Value Date    WBC 6.8 03/20/2024    RBC 4.72 03/20/2024    HGB 14.4 03/20/2024    HCT 45.8 03/20/2024    MCV 97.0 03/20/2024    MCH 30.5 03/20/2024    MCHC 31.4 03/20/2024    RDW 13.4 03/20/2024    .0 03/20/2024     Lab Results   Component Value Date     03/20/2024    K 4.6 03/20/2024     03/20/2024    CO2 30.0 03/20/2024    BUN 15 03/20/2024    CREATSERUM 0.73 03/20/2024     (H) 03/20/2024    CA 9.6 03/20/2024          Vital Signs:  Body mass index is 34.94 kg/m².   height is 1.664 m (5' 5.5\") and weight is 96.7 kg (213 lb 3.2 oz). Her oral temperature is 98.2 °F (36.8 °C). Her blood pressure is 142/70 and her pulse is 91. Her respiration is 16 and oxygen saturation is 96%.   Vitals:    03/27/24 1256 04/03/24 1421 04/03/24 1448   BP:  (!) 163/79 142/70   Pulse:  91    Resp:  16    Temp:  98.2 °F (36.8 °C)    TempSrc:  Oral    SpO2:  96%    Weight: 95.7 kg (211 lb) 96.7 kg (213 lb 3.2 oz)    Height: 1.664 m (5' 5.5\") 1.664 m (5' 5.5\")         Anesthesia Evaluation     Patient summary reviewed and Nursing notes reviewed    Airway   Mallampati: II  TM distance: <3 FB  Neck ROM: full  Dental      Comment: poor      Pulmonary - normal exam   (+) sleep apnea  Cardiovascular - normal exam  (+) hypertension    ECG reviewed    Neuro/Psych    (+)  CVA,        Comments: Scoliosis     GI/Hepatic/Renal    (+) GERD, liver disease    Endo/Other - negative ROS   Abdominal  - normal exam                 Anesthesia Plan:   ASA:  3  Plan:   General  Airway:  ETT  Post-op Pain Management: IV analgesics  Plan Comments: Pt not interested in spinal  Hx scoliosis   Informed Consent Plan and Risks Discussed With:  Patient  Use of Blood Products Discussed With:  Patient      I  have informed Silvana Owusu and/or legal guardian or family member of the nature of the anesthetic plan, benefits, risks including possible dental damage if relevant, major complications, and any alternative forms of anesthetic management.   All of the patient's questions were answered to the best of my ability. The patient desires the anesthetic management as planned.  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

## 2024-04-03 NOTE — ANESTHESIA PROCEDURE NOTES
Airway  Date/Time: 4/3/2024 4:03 PM  Urgency: Elective      General Information and Staff    Patient location during procedure: OR  Anesthesiologist: Gentry Brownlee MD  Performed: anesthesiologist   Performed by: Gentry Brownlee MD  Authorized by: Gentry Brownlee MD      Indications and Patient Condition  Indications for airway management: anesthesia  Sedation level: deep  Preoxygenated: yes  Patient position: sniffing  Mask difficulty assessment: 2 - vent by mask + OA or adjuvant +/- NMBA    Final Airway Details  Final airway type: endotracheal airway      Successful airway: ETT  Cuffed: yes   Successful intubation technique: Video laryngoscopy  Endotracheal tube insertion site: oral  Blade type: [employer].  Blade size: #3  ETT size (mm): 7.0    Cormack-Lehane Classification: grade I - full view of glottis  Placement verified by: capnometry   Measured from: lips  Number of attempts at approach: 1  Number of other approaches attempted: 0

## 2024-04-03 NOTE — INTERVAL H&P NOTE
Pre-op Diagnosis: Mechanical complications    The above referenced H&P was reviewed by Cyril Hunt MD on 4/3/2024, the patient was examined and no significant changes have occurred in the patient's condition since the H&P was performed.  I discussed with the patient and/or legal representative the potential benefits, risks and side effects of this procedure; the likelihood of the patient achieving goals; and potential problems that might occur during recuperation.  I discussed reasonable alternatives to the procedure, including risks, benefits and side effects related to the alternatives and risks related to not receiving this procedure.  We will proceed with procedure as planned.    Patient is at acceptable risk to proceed with revision left total hip replacement.

## 2024-04-04 LAB
ALBUMIN SERPL-MCNC: 3.4 G/DL (ref 3.2–4.8)
ALBUMIN/GLOB SERPL: 1.2 {RATIO} (ref 1–2)
ALP LIVER SERPL-CCNC: 68 U/L
ALT SERPL-CCNC: 48 U/L
ANION GAP SERPL CALC-SCNC: 5 MMOL/L (ref 0–18)
AST SERPL-CCNC: 56 U/L (ref ?–34)
BILIRUB SERPL-MCNC: 0.5 MG/DL (ref 0.2–1.1)
BUN BLD-MCNC: 13 MG/DL (ref 9–23)
BUN/CREAT SERPL: 18.6 (ref 10–20)
CALCIUM BLD-MCNC: 8.7 MG/DL (ref 8.7–10.4)
CHLORIDE SERPL-SCNC: 105 MMOL/L (ref 98–112)
CO2 SERPL-SCNC: 28 MMOL/L (ref 21–32)
CREAT BLD-MCNC: 0.7 MG/DL
DEPRECATED RDW RBC AUTO: 48 FL (ref 35.1–46.3)
EGFRCR SERPLBLD CKD-EPI 2021: 93 ML/MIN/1.73M2 (ref 60–?)
ERYTHROCYTE [DISTWIDTH] IN BLOOD BY AUTOMATED COUNT: 13.2 % (ref 11–15)
GLOBULIN PLAS-MCNC: 2.8 G/DL (ref 2.8–4.4)
GLUCOSE BLD-MCNC: 142 MG/DL (ref 70–99)
HCT VFR BLD AUTO: 37.8 %
HGB BLD-MCNC: 11.9 G/DL
MCH RBC QN AUTO: 31.2 PG (ref 26–34)
MCHC RBC AUTO-ENTMCNC: 31.5 G/DL (ref 31–37)
MCV RBC AUTO: 99 FL
OSMOLALITY SERPL CALC.SUM OF ELEC: 289 MOSM/KG (ref 275–295)
PLATELET # BLD AUTO: 207 10(3)UL (ref 150–450)
PLATELETS.RETICULATED NFR BLD AUTO: 8 % (ref 0–7)
POTASSIUM SERPL-SCNC: 4.8 MMOL/L (ref 3.5–5.1)
PROT SERPL-MCNC: 6.2 G/DL (ref 5.7–8.2)
RBC # BLD AUTO: 3.82 X10(6)UL
SODIUM SERPL-SCNC: 138 MMOL/L (ref 136–145)
WBC # BLD AUTO: 11.4 X10(3) UL (ref 4–11)

## 2024-04-04 PROCEDURE — 85018 HEMOGLOBIN: CPT | Performed by: ORTHOPAEDIC SURGERY

## 2024-04-04 PROCEDURE — 85027 COMPLETE CBC AUTOMATED: CPT | Performed by: FAMILY MEDICINE

## 2024-04-04 PROCEDURE — 85014 HEMATOCRIT: CPT | Performed by: ORTHOPAEDIC SURGERY

## 2024-04-04 PROCEDURE — 97530 THERAPEUTIC ACTIVITIES: CPT

## 2024-04-04 PROCEDURE — 97161 PT EVAL LOW COMPLEX 20 MIN: CPT

## 2024-04-04 PROCEDURE — 97166 OT EVAL MOD COMPLEX 45 MIN: CPT

## 2024-04-04 PROCEDURE — 80053 COMPREHEN METABOLIC PANEL: CPT | Performed by: ORTHOPAEDIC SURGERY

## 2024-04-04 RX ORDER — FAMOTIDINE 20 MG/1
20 TABLET, FILM COATED ORAL 2 TIMES DAILY
Status: SHIPPED | COMMUNITY
Start: 2024-04-04

## 2024-04-04 RX ORDER — PSEUDOEPHEDRINE HCL 30 MG
100 TABLET ORAL 2 TIMES DAILY
Status: SHIPPED | COMMUNITY
Start: 2024-04-04

## 2024-04-04 RX ORDER — TRAMADOL HYDROCHLORIDE 50 MG/1
TABLET ORAL EVERY 6 HOURS PRN
Status: SHIPPED | COMMUNITY
Start: 2024-04-04

## 2024-04-04 RX ORDER — ACETAMINOPHEN 325 MG/1
650 TABLET ORAL EVERY 6 HOURS SCHEDULED
Status: SHIPPED | COMMUNITY
Start: 2024-04-04

## 2024-04-04 RX ORDER — ASPIRIN 81 MG/1
81 TABLET ORAL 2 TIMES DAILY
Status: SHIPPED | COMMUNITY
Start: 2024-04-04

## 2024-04-04 NOTE — PLAN OF CARE
Patient alert and oriented x 4. Post op day 1. Tramadol administered for pain as needed. Dressing is prevena wound vac. Up with 2x stand pivot. Voiding freely. Patients diet is general. SCDs and baby ASA for VTE prophylaxis. Vital signs monitored. Cough and deep breathe in addition to incentive spirometer. Bed in lowest position, call light within reach, and non skid socks in place for fall precautions. All needs within reach.  at bedside. Rounding done by nursing staff. Plan for discharge is HH vs [facility] pending patient progress.       Problem: Patient Centered Care  Goal: Patient preferences are identified and integrated in the patient's plan of care  Description: Interventions:  - What would you like us to know as we care for you? This is my 5th hip surgery  - Provide timely, complete, and accurate information to patient/family  - Incorporate patient and family knowledge, values, beliefs, and cultural backgrounds into the planning and delivery of care  - Encourage patient/family to participate in care and decision-making at the level they choose  - Honor patient and family perspectives and choices  Outcome: Progressing     Problem: Patient/Family Goals  Goal: Patient/Family Long Term Goal  Description: Patient's Long Term Goal: To be discharged    Interventions:  - get clearance   - See additional Care Plan goals for specific interventions  Outcome: Progressing  Goal: Patient/Family Short Term Goal  Description: Patient's Short Term Goal: Manage pain    Interventions:   - monitor and assess pain   - See additional Care Plan goals for specific interventions  Outcome: Progressing     Problem: PAIN - ADULT  Goal: Verbalizes/displays adequate comfort level or patient's stated pain goal  Description: INTERVENTIONS:  - Encourage pt to monitor pain and request assistance  - Assess pain using appropriate pain scale  - Administer analgesics based on type and severity of pain and evaluate response  - Implement  non-pharmacological measures as appropriate and evaluate response  - Consider cultural and social influences on pain and pain management  - Manage/alleviate anxiety  - Utilize distraction and/or relaxation techniques  - Monitor for opioid side effects  - Notify MD/LIP if interventions unsuccessful or patient reports new pain  - Anticipate increased pain with activity and pre-medicate as appropriate  Outcome: Progressing     Problem: RISK FOR INFECTION - ADULT  Goal: Absence of fever/infection during anticipated neutropenic period  Description: INTERVENTIONS  - Monitor WBC  - Administer growth factors as ordered  - Implement neutropenic guidelines  Outcome: Progressing     Problem: SAFETY ADULT - FALL  Goal: Free from fall injury  Description: INTERVENTIONS:  - Assess pt frequently for physical needs  - Identify cognitive and physical deficits and behaviors that affect risk of falls.  - [place] fall precautions as indicated by assessment.  - Educate pt/family on patient safety including physical limitations  - Instruct pt to call for assistance with activity based on assessment  - Modify environment to reduce risk of injury  - Provide assistive devices as appropriate  - Consider OT/PT consult to assist with strengthening/mobility  - Encourage toileting schedule  Outcome: Progressing     Problem: DISCHARGE PLANNING  Goal: Discharge to home or other facility with appropriate resources  Description: INTERVENTIONS:  - Identify barriers to discharge w/pt and caregiver  - Include patient/family/discharge partner in discharge planning  - Arrange for needed discharge resources and transportation as appropriate  - Identify discharge learning needs (meds, wound care, etc)  - Arrange for interpreters to assist at discharge as needed  - Consider post-discharge preferences of patient/family/discharge partner  - Complete POLST form as appropriate  - Assess patient's ability to be responsible for managing their own health  -  Refer to Case Management Department for coordinating discharge planning if the patient needs post-hospital services based on physician/LIP order or complex needs related to functional status, cognitive ability or social support system  Outcome: Progressing

## 2024-04-04 NOTE — CM/SW NOTE
Per chart, Anticipated therapy need: Gradual Rehabilitative Therapy    SW messaged NP Conchis w/ above update.    Tentative [facility] referrals sent in Aidin. PASRR completed/uploaded.    Need for DC:  Confirm HH v [facility]  HH or [facility] list/choice  If [facility]: [facility] decision    PLAN: Home w/ HH vs [facility] - pending above & med clear      SW/CM to remain available for support and/or discharge planning.         Candace Levine, MSW, LSW [id number]

## 2024-04-04 NOTE — PROGRESS NOTES
[facility]  part of [facility]    Ortho Medical Progress Note     Silvana Owusu Patient Status:  Inpatient    11/3/1955 MRN [id number]   Location [facility] 4W/SW/SE Attending Pepe Aguero MD   Hosp Day # 1 PCP No primary care provider on file.       Subjective:   Silvana Owusu is a(n) 68 year old female post operative left hip revision arthroplasty per Dr. Aguero post op day # 1.  Denies SOB, chest pain, dizziness. Nausea is being treated.  She has not vomited Toe touch.   The surgical pain is controlled.  The patient has been up with PT and tolerated fair but walked only in room.  is present. She feels tired. She did not sleep much last night.    Objective:   Vital Signs:  Blood pressure 118/62, pulse 66, temperature 97.8 °F (36.6 °C), temperature source Temporal, resp. rate 18, height 5' 5.5\" (1.664 m), weight 213 lb 3.2 oz (96.7 kg), SpO2 96%.     General: No acute distress.   HEENT: Moist mucous membranes. PERRL  Respiratory: Clear to auscultation bilaterally.  No wheezes. No rhonchi.   Cardiovascular: S1, S2.  Regular rate and rhythm.    Abdomen: Soft, nontender, nondistended.  Positive bowel sounds   Neurologic: Oriented and alert. Sensation present in bilateral lower extremities   Musculoskeletal: Surgical dressing dry and intact.  Dorsi and plantar flexion present. No calf tenderness.  no ankle edema  Skin: No lesions. No erythema. Color normal  Psychiatric: Appropriate mood and affect.      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  Toe touch  HTN    Sleep apnea    reflux            Results:     Lab Results   Component Value Date    WBC 11.4 (H) 2024    HGB 11.9 (L) 2024    HCT 37.8 2024    .0 2024    CREATSERUM 0.70 2024    BUN 13 2024     2024    K 4.8 2024     2024    CO2 28.0 2024     (H) 2024    CA 8.7 2024     ALB 3.4 04/04/2024    ALKPHO 68 04/04/2024    BILT 0.5 04/04/2024    TP 6.2 04/04/2024    AST 56 (H) 04/04/2024    ALT 48 04/04/2024    ESRML 29 03/20/2024    CRP 1.70 (H) 03/20/2024       XR HIP W OR WO PELVIS 2 OR 3 VIEWS, LEFT (CPT=73502)    Result Date: 4/3/2024  CONCLUSION:   Expected postoperative changes from revision left total hip arthroplasty, described above.     Dictated by (CST): Gilles Dooley MD on 4/03/2024 at 7:03 PM     Finalized by (CST): Gilles Dooley MD on 4/03/2024 at 7:06 PM                     Recommendations:  PT/OT: per ortho  Pain Control: per ortho -  Incentive Spirometry  DVT Prophylaxis - ASA  GI Prophylaxis   Discharge plans - home with homecare after passing PT and nausea controlled    The patient's condition was discussed with Dr. Mojica.        Is this a shared or split note between Advanced Practice Provider and Physician? No     AMIE To  Office [phone]  Cell [phone]  4/4/2024

## 2024-04-04 NOTE — PLAN OF CARE
POD1 L hip revision. Patient is alert and oriented.  Gave tramadol and scheduled tylenol for pain. Zofran given for nausea.  Up to bathroom with rolling chair.  Prevena drain in continuous operation.  Abductor pillow in place while in bed.  Family at bedside updated on care.   Problem: Patient Centered Care  Goal: Patient preferences are identified and integrated in the patient's plan of care  Description: Interventions:  - What would you like us to know as we care for you?   - Provide timely, complete, and accurate information to patient/family  - Incorporate patient and family knowledge, values, beliefs, and cultural backgrounds into the planning and delivery of care  - Encourage patient/family to participate in care and decision-making at the level they choose  - Honor patient and family perspectives and choices  Outcome: Progressing     Problem: Patient/Family Goals  Goal: Patient/Family Long Term Goal  Description: Patient's Long Term Goal:     Interventions:  -   - See additional Care Plan goals for specific interventions  Outcome: Progressing  Goal: Patient/Family Short Term Goal  Description: Patient's Short Term Goal:     Interventions:   -   - See additional Care Plan goals for specific interventions  Outcome: Progressing

## 2024-04-04 NOTE — PHYSICAL THERAPY NOTE
PHYSICAL THERAPY TREATMENT NOTE - INPATIENT     Room Number: 407/407-A       Presenting Problem: failure of left total hip arthroplasty, s/p  L hip revision    Problem List  Principal Problem:    Failure of left total hip arthroplasty (HCC)  Active Problems:    Pre-op testing      PHYSICAL THERAPY ASSESSMENT   Patient demonstrates fair progress this session, goals  remain in progress.    Patient continues to function below baseline with bed mobility, transfers, gait, stair negotiation, standing prolonged periods, and performing household tasks.  Contributing factors to remaining limitations include decreased functional strength, decreased endurance/aerobic capacity, pain, impaired standing balance, decreased muscular endurance, difficulty maintaining precautions, limited LLE ROM, and LLE TTWB .  Next session anticipate patient to progress bed mobility, transfers, and gait.  Physical Therapy will continue to follow patient for duration of hospitalization.    Patient continues to benefit from continued skilled PT services: to promote return to prior level of function and safety with continuous assistance and gradual rehabilitative therapy .    PLAN  PT Treatment Plan: Bed mobility;Body mechanics;Coordination;Endurance;Energy conservation;Patient education;Gait training;Range of motion;Strengthening;Transfer training;Balance training;Stair training  Frequency (Obs): Daily    SUBJECTIVE  \"That felt like a lot of work\"    OBJECTIVE  Precautions: LIDIA - posterior;Limb alert - left (wound vac, NO ACTIVE HIP ABDUCTION)    WEIGHT BEARING RESTRICTION  L Lower Extremity: Toe Touch Weight Bearing    PAIN ASSESSMENT   Rating: Unable to rate  Location: left hip  Management Techniques: Activity promotion;Body mechanics;Repositioning    BALANCE  Static Sitting: Good  Dynamic Sitting: Fair +  Static Standing: Poor +  Dynamic Standing: Poor    ACTIVITY TOLERANCE  Pulse: 66  Heart Rate Source: Monitor     BP: 101/60 (113/51 mmHg with  activity)  BP Location: Right arm  BP Method: Automatic  Patient Position: Sitting     O2 WALK  Oxygen Therapy  SPO2% on Room Air at Rest: 95    AM-PAC '6-Clicks' INPATIENT SHORT FORM - BASIC MOBILITY  How much difficulty does the patient currently have...  Patient Difficulty: Turning over in bed (including adjusting bedclothes, sheets and blankets)?: A Lot   Patient Difficulty: Sitting down on and standing up from a chair with arms (e.g., wheelchair, bedside commode, etc.): A Lot   Patient Difficulty: Moving from lying on back to sitting on the side of the bed?: A Lot   How much help from another person does the patient currently need...   Help from Another: Moving to and from a bed to a chair (including a wheelchair)?: A Lot   Help from Another: Need to walk in hospital room?: A Lot   Help from Another: Climbing 3-5 steps with a railing?: A Lot     AM-PAC Score:  Raw Score: 12   Approx Degree of Impairment: 68.66%   Standardized Score (AM-PAC Scale): 35.33   CMS Modifier (G-Code): CL    FUNCTIONAL ABILITY STATUS  Functional Mobility/Gait Assessment  Gait Assistance: Minimum assistance (A x 2)  Distance (ft): 10 ft  Assistive Device: Rolling walker  Pattern: L Decreased stance time;Shuffle (decreased albertina speed, decreased step length, initially good compliance with TTWB LLE, however, with fatigue, increased difficulty complying with TTWB LLE. Rolling commode brought behind patient to sit down on to get to bathroom.)  Sit to Supine: maximum assist, assist with LLE in order to maintain posterior hip precautions and no active abduction   Sit to Stand: moderate assist; cues provided for appropriate UE and LLE placement in order to maintain compliance with post-op precautions   SPT rolling commode to EOB to the right: Min A with RW    Additional information: Patient making slightly progress towards IP PT goals, tolerating short ambulation distance. Patient unable to tolerate ambulating to the bathroom due to fatigue  - patient with increased difficulty maintaining LLE TTWB as she fatigued. Patient benefits from increased time, rest breaks, and cues in order to maximize participation. Patient continuing to report nausea with activity - no bouts of emesis.     The patient's Approx Degree of Impairment: 68.66% has been calculated based on documentation in the [facility] '6 clicks' Inpatient Daily Activity Short Form.  Research supports that patients with this level of impairment may benefit from rehab.  Final disposition will be made by interdisciplinary medical team.    Patient sitting in bedside chair with  present in room upon arrival. RN approved activity and present to assist throughout session. Educated patient on POC and benefits of mobilization. Agreeable to participate. Patient reporting L hip pain, not quantified per the pain scale.  Patient End of Session: In bed;Needs met;Call light within reach;RN aware of session/findings;All patient questions and concerns addressed;Ice applied;Family present    CURRENT GOALS   Goals to be met by: 4/18/24  Patient Goal Patient's self-stated goal is: go home   Goal #1 Patient is able to demonstrate supine - sit EOB @ level: CGA   Goal #1   Current Status  Max A return to supine    Goal #2 Patient is able to demonstrate transfers Sit to/from Stand at assistance level: SBA      Goal #2  Current Status  Mod A with RW   Goal #3 Patient is able to ambulate 75 feet with assistive device at assistance level: SBA while maintaining LLE TTWB status   Goal #3   Current Status  Min A x 2 10 ft with RW   Goal #4 Pending progress should patient return home   Patient will negotiate 6 stairs/one curb w/ assistive device and Min A   Goal #4   Current Status  NT   Goal #5 Patient verbalizes and/or demonstrates all precautions and safety concerns independently   Goal #5   Current Status  Ongoing   Goal #6 Patient independently performs home exercise program for ROM/strengthening per the instructions  provided in preparation for discharge.   Goal #6  Current Status  Ongoing     Gait Trainin minutes  Therapeutic Activity: 20 minutes

## 2024-04-04 NOTE — OCCUPATIONAL THERAPY NOTE
OCCUPATIONAL THERAPY EVALUATION - INPATIENT     Room Number: 407/407-A  Evaluation Date: 4/4/2024  Type of Evaluation: Initial  Presenting Problem: s/p left hip revision    Physician Order: IP Consult to Occupational Therapy  Reason for Therapy: ADL/IADL Dysfunction and Discharge Planning    OCCUPATIONAL THERAPY ASSESSMENT   Patient is a 68 year old female admitted 4/3/2024 for failure of left total hip arthroplasty, s/p L hip revision.  Prior to admission, patient's baseline is Mod Ind w/ ADLs and functional mobility - started using RW within the last week.  Patient is currently functioning below baseline with ADLs and functional mobility.  Patient is requiring moderate assist and maximum assist as a result of the following impairments: nausea, poor standing tolerance, decreased standing balance while maintaining TTWB. Occupational Therapy will continue to follow for duration of hospitalization.    Patient will benefit from continued skilled OT Services to promote return to prior level of function and safety with continuous assistance and gradual rehabilitative therapy     PLAN  OT Treatment Plan: Balance activities;Energy conservation/work simplification techniques;ADL training;Functional transfer training;Endurance training;Compensatory technique education  OT Device Recommendations: Transfer tub bench; Reacher; Sock aid; Long-handled shoehorn; Long-handled sponge    OCCUPATIONAL THERAPY MEDICAL/SOCIAL HISTORY   Problem List   Principal Problem:    Failure of left total hip arthroplasty (HCC)  Active Problems:    Pre-op testing    HOME SITUATION  Type of Home: House  Home Layout: One level (with basement shower 11 stairs with railing (tub on main level))  Lives With: Spouse  Toilet and Equipment: Toilet riser with arms  Drives: Yes  Patient Regularly Uses: Glasses    Prior Level of [place]: Mod Ind     SUBJECTIVE  \"I am nauseous, I can't eat\"     OCCUPATIONAL THERAPY EXAMINATION   OBJECTIVE  Precautions: LIDIA  - posterior; Limb alert - left (wound vac, NO ACTIVE HIP ABDUCTION)  Fall Risk: -- (Moderate fall risk)    WEIGHT BEARING RESTRICTION  L Lower Extremity: Toe Touch Weight Bearing    PAIN ASSESSMENT  Rating: Unable to rate (pt reported low pain  --- pt with high level nausea)    ACTIVITY TOLERANCE  Pulse: 62        BP: 118/62 (120/65 mmHg sitting EOB, 137/63 mmHg with activity (reported dizziness with activity))             O2 SATURATIONS  Oxygen Therapy  SPO2% on Room Air at Rest: 95    COGNITION  WFL    RANGE OF MOTION   Upper extremity ROM is within functional limits     STRENGTH ASSESSMENT  Upper extremity strength is within functional limits     ACTIVITIES OF DAILY LIVING ASSESSMENT  AM-PAC ‘6-Clicks’ Inpatient Daily Activity Short Form  How much help from another person does the patient currently need…  -   Putting on and taking off regular lower body clothing?: A Lot  -   Bathing (including washing, rinsing, drying)?: A Lot  -   Toileting, which includes using toilet, bedpan or urinal? : A Lot  -   Putting on and taking off regular upper body clothing?: A Little  -   Taking care of personal grooming such as brushing teeth?: A Little  -   Eating meals?: None    AM-PAC Score:  Score: 16  Approx Degree of Impairment: 53.32%  Standardized Score (AM-PAC Scale): 35.96  CMS Modifier (G-Code): CK    FUNCTIONAL TRANSFER ASSESSMENT  Sit to Stand: Edge of Bed  Edge of Bed: Moderate Assist (verbal and tactile cues to maintain TTWB , use RW)    BED MOBILITY  Supine to Sit : Maximum Assist    FUNCTIONAL ADL ASSESSMENT  Grooming Seated: Independent  LB Dressing Seated: Dependent    Skilled Therapy Provided: RN cleared pt for participation in occupational therapy session, which was completed in collaboration with physical therapy. Upon arrival, pt was supine in bed and agreeable to activity. Pt's supportive spouse Masood was present during session. Pt benefited from additional time, verbal cues, RW to maximize participation.     Pt with fair tolerance towards activity -- pt with nausea but no vomiting.  Reviewed precautions and wb status -- pt benefited from verbal and tactile cues to maintain TTWB during dynamic activity. Frequent breaks between activity due to bouts of nausea. To assess pt's safe participation during daily tasks while also providing intermittent education to maximize safety and participation, therapist facilitated the following: LE dressing at bed level, bed mobility, simulated commode t/f to bedside chair with RW, set-up grooming in sitting.   Introduced LHAE  - LE dressing education.     EDUCATION PROVIDED  Patient: Role of Occupational Therapy; Plan of Care; Functional Transfer Techniques; Compensatory ADL Techniques  Patient's Response to Education: Verbalized Understanding; Returned Demonstration    The patient's Approx Degree of Impairment: 53.32% has been calculated based on documentation in the [facility] '6 clicks' Inpatient Daily Activity Short Form.  Research supports that patients with this level of impairment may benefit from REHAB.  Final disposition will be made by interdisciplinary medical team.    Patient End of Session: Up in chair;Needs met;Call light within reach;RN aware of session/findings;Family present    OT Goals  Patient self-stated goal is: did not state     Patient will complete LE dressing with SBA  Comment:     Patient will complete toilet transfer with SBA  Comment:     Patient will complete self care task at sink level with SBA   Comment:    Patient will independently recall posterior hip precautions  Comment:         Goals  on: 3/18/24  Frequency: 3x/w    Patient Evaluation Complexity Level:   Occupational Profile/Medical History MODERATE - Expanded review of history including review of medical or therapy record   Specific performance deficits impacting engagement in ADL/IADL MODERATE  3 - 5 performance deficits   Client Assessment/Performance Deficits MODERATE - Comorbidities and min to  mod modifications of tasks    Clinical Decision Making MODERATE - Analysis of occupational profile, detailed assessments, several treatment options    Overall Complexity MODERATE     OT Session Time: 15 minutes  Therapeutic Activity: 15 minutes

## 2024-04-04 NOTE — PHYSICAL THERAPY NOTE
PHYSICAL THERAPY HIP EVALUATION - INPATIENT     Room Number: 407/407-A  Evaluation Date: 4/4/2024  Type of Evaluation: Initial  Physician Order: PT Eval and Treat    Presenting Problem: failure of left total hip arthroplasty, s/p  L hip revision     Reason for Therapy: Mobility Dysfunction and Discharge Planning    PHYSICAL THERAPY ASSESSMENT   Patient is a 68 year old female admitted 4/3/2024 for failure of left total hip arthroplasty, s/p  L hip revision. Prior to admission, patient's baseline is Mod I for ADLs/functional mobility with recent use of rolling walker. Patient is currently functioning below baseline with bed mobility, transfers, gait, stair negotiation, and standing prolonged periods.  Patient is requiring minimal assist, moderate assist, and maximum assist as a result of the following impairments: decreased functional strength, decreased endurance/aerobic capacity, pain, impaired standing balance, decreased muscular endurance, difficulty maintaining precautions, limited LLE ROM, and LLE TTWB .  Physical Therapy will continue to follow for duration of hospitalization.    Patient will benefit from continued skilled PT Services to promote return to prior level of function and safety with continuous assistance and gradual rehabilitative therapy .     PLAN  PT Treatment Plan: Bed mobility;Body mechanics;Coordination;Endurance;Energy conservation;Patient education;Gait training;Range of motion;Strengthening;Transfer training;Balance training;Stair training  Rehab Potential : Good  Frequency (Obs): BID    PHYSICAL THERAPY MEDICAL/SOCIAL HISTORY     Problem List  Principal Problem:    Failure of left total hip arthroplasty (HCC)  Active Problems:    Pre-op testing      HOME SITUATION  Home Situation  Type of Home: House  Home Layout: One level (with basement shower 11 stairs with railing (tub on main level))  Stairs to Enter : 6  Railing: Yes  Lives With: Spouse  Drives: Yes  Patient Owned Equipment: Rolling  walker  Patient Regularly Uses: Glasses     SUBJECTIVE  \"I've been nauseous\"    PHYSICAL THERAPY EXAMINATION   OBJECTIVE  Precautions: LIDIA - posterior;Limb alert - left (wound vac, NO ACTIVE HIP ABDUCTION)  Fall Risk:  (Moderate fall risk)    WEIGHT BEARING RESTRICTION  Weight Bearing Restriction: L lower extremity  L Lower Extremity: Toe Touch Weight Bearing    PAIN ASSESSMENT  Rating: Unable to rate  Location: left hip  Management Techniques: Activity promotion;Body mechanics;Repositioning    COGNITION  Overall Cognitive Status:  WFL - within functional limits    RANGE OF MOTION AND STRENGTH ASSESSMENT  Upper extremity ROM and strength are within functional limits   Lower extremity ROM is within functional limits; LLE limited due to pain and post-op precautions  Lower extremity strength is within functional limits; LLE not formally assessed due to pain and post-op precautions      BALANCE  Static Sitting: Good  Dynamic Sitting: Fair +  Static Standing: Fair -  Dynamic Standing: Poor +    ACTIVITY TOLERANCE  Pulse: 66  Heart Rate Source: Monitor     BP: 118/62 (120/65 mmHg sitting EOB, 137/63 mmHg with activity (reported dizziness with activity))  BP Location: Left arm  BP Method: Automatic  Patient Position: Lying    O2 WALK  Oxygen Therapy  SPO2% on Room Air at Rest: 95    AM-PAC '6-Clicks' INPATIENT SHORT FORM - BASIC MOBILITY  How much difficulty does the patient currently have...  Patient Difficulty: Turning over in bed (including adjusting bedclothes, sheets and blankets)?: A Lot   Patient Difficulty: Sitting down on and standing up from a chair with arms (e.g., wheelchair, bedside commode, etc.): A Lot   Patient Difficulty: Moving from lying on back to sitting on the side of the bed?: A Lot   How much help from another person does the patient currently need...   Help from Another: Moving to and from a bed to a chair (including a wheelchair)?: A Little   Help from Another: Need to walk in hospital room?: A  Lot   Help from Another: Climbing 3-5 steps with a railing?: A Lot     AM-PAC Score:  Raw Score: 13   Approx Degree of Impairment: 64.91%   Standardized Score (AM-PAC Scale): 36.74   CMS Modifier (G-Code): CL    FUNCTIONAL ABILITY STATUS  Functional Mobility/Gait Assessment  Gait Assistance: Minimum assistance  Distance (ft): 2 ft bed>chair to the right  Assistive Device: Rolling walker  Pattern: L Decreased stance time;Shuffle (decreased albertina speed, decreased step length, fair to good compliance with post-op TTWB LLE)  Supine to Sit: maximum assist.  Sit to Stand: moderate assist    Exercise/Education Provided:  Bed mobility  Body mechanics  Energy conservation  Gait training  Posture  Transfer training  Patient is LLE TTWB with posterior hip precautions and NO ACTIVE hip abduction. Patient educated on POC. Education on physiological benefits of mobility post operatively. Discussed posterior LIDIA protocol, weight bearing status, precautions and education on therapeutic exercises - patient provided with handout to summarize. Patient with fair to good compliance with LLE TTWB status. Activity limited due to increased nausea and dizziness (vitals WNL). Patient benefits from increased time, lengthy rest breaks, and cues in order to complete functional mobility tasks.    The patient's Approx Degree of Impairment: 64.91% has been calculated based on documentation in the [facility] '6 clicks' Inpatient Basic Mobility Short Form.  Research supports that patients with this level of impairment may benefit from rehab.  Final disposition will be made by interdisciplinary medical team.    RN approved activity. Patient received supine in bed; agreeable to participate in therapy on this date. Patient reporting L hip pain, not quantified per the pain scale.  Patient End of Session: Up in chair;Needs met;Call light within reach;RN aware of session/findings;All patient questions and concerns addressed;Family present    CURRENT  GOALS  Goals to be met by: 4/18/24  Patient Goal Patient's self-stated goal is: go home   Goal #1 Patient is able to demonstrate supine - sit EOB @ level: CGA   Goal #1   Current Status    Goal #2 Patient is able to demonstrate transfers Sit to/from Stand at assistance level: SBA     Goal #2  Current Status    Goal #3 Patient is able to ambulate 75 feet with assistive device at assistance level: SBA while maintaining LLE TTWB status   Goal #3   Current Status    Goal #4 Pending progress should patient return home   Patient will negotiate 6 stairs/one curb w/ assistive device and Min A   Goal #4   Current Status    Goal #5 Patient verbalizes and/or demonstrates all precautions and safety concerns independently   Goal #5   Current Status    Goal #6 Patient independently performs home exercise program for ROM/strengthening per the instructions provided in preparation for discharge.   Goal #6  Current Status      Patient Evaluation Complexity Level:  History Low - no personal factors and/or co-morbidities   Examination of body systems Low -  addressing 1-2 elements   Clinical Presentation  Moderate - Evolving   Clinical Decision Making  Low Complexity     Therapeutic Activity:  25 minutes

## 2024-04-04 NOTE — CM/SW NOTE
04/04/24 0900   CM/SW Referral Data   Referral Source Physician   Reason for Referral Discharge planning   Informant Patient;Spouse/Significant Other  (Masood Owusu)   Medical Hx   Does patient have an established PCP? No   Patient Info   Patient's Current Mental Status at Time of Assessment Alert;Oriented   Patient's Home Environment House   Number of Levels in Home 2   Number of Stair in Home 6 steps to enter, 11 steps between first and second floor   Patient lives with Spouse/Significant other   Patient Status Prior to Admission   Independent with ADLs and Mobility Yes   Discharge Needs   Anticipated D/C needs Home health care;Outpatient therapy       MDO received for HH eval. VERONICA intern met with pt,  Masood at bedside. Above assessment completed.    Pt is home with .     No cane or walker at baseline. Pt stated that after being admitted, she needs a walker to ambulate. No home O2.     VERONICA intern informed pt that pt would benefit from HH at discharge. Pt was agreeable. Pt stated that she has an outpatient PT at [facility] in [place], [place].     HH referrals sent in Aidin. F2F completed.    Plan: Home with HH pending list/choice & med clear    VERONICA Modi Intern, MSW candidate.

## 2024-04-05 PROBLEM — Z01.818 PRE-OP TESTING: Status: RESOLVED | Noted: 2024-04-03 | Resolved: 2024-04-05

## 2024-04-05 PROBLEM — Z96.649 S/P REVISION OF TOTAL HIP: Status: ACTIVE | Noted: 2024-04-05

## 2024-04-05 PROCEDURE — 97535 SELF CARE MNGMENT TRAINING: CPT

## 2024-04-05 PROCEDURE — 97530 THERAPEUTIC ACTIVITIES: CPT

## 2024-04-05 NOTE — OPERATIVE REPORT
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  PROCEDURE:  Left hip revision.    ASSISTANT SURGEON:  Cyril Hunt M.D.    ANESTHESIA:  Spinal with sedation.    ESTIMATED BLOOD LOSS:  200 mL.    COMPLICATIONS:  None.    COMPONENT USED:    1.   DJO surgical size 54 mm Empowr acetabular component.  2.   A 44 mm dual-mobility metal liner.  3.   A 44 mm dual-mobility polyethylene bearing.  4.   A 28 mm, +6 Biolox Delta ceramic femoral head.    INDICATIONS:  The patient is a 68-year-old female who has undergone a prior left total hip arthroplasty.  She has been seen in the orthopedic clinic where radiographs demonstrated a somewhat vertical acetabular component with polyethylene wear.  She has been experiencing increasing episodes of instability and the polyethylene wear has progressed.  She has failed nonoperative measures and wished to proceed with left hip revision.  She was aware of potential risks of surgery including, but not limited to, infection, bleeding, nerve damage, component loosening, failure to relieve her symptoms, loss of motion, persistent pain, instability, leg length inequality, and the need for additional surgery in the future.  We also discussed the previous TASH stem with an 11/13 trunnion and the options for femoral head sizing.    FINDINGS:  Intraoperatively, the patient demonstrated a well-fixed femoral component.  The previous TASH femoral stem was intact with an intact trunnion.  The acetabular component demonstrated  significant polyethylene wear.  There was gross motion occurring at the locking mechanism of the polyethylene.  The acetabular component was well fixed, however, again it was in a vertical position.  Based upon this, the decision was made to perform an acetabular revision and placement of a dual-mobility articulation.      OPERATIVE TECHNIQUE:  The patient was admitted to the same-day surgery program on 04/03/2024.  Following proper identification in the preoperative holding area, the patient was brought to the operating room suite and the above-stated procedure was confirmed.  She was brought to the operating room and received a spinal anesthetic with sedation.  She was placed in the right lateral decubitus position.  Care was taken to pad all bony prominences.  The left lower extremity was prepped and draped in the usual sterile fashion.      Following demonstration of adequate anesthesia, a surgical incision was made in the left hip using the previous surgical incision.  This extended slightly proximally and distally.  The epidermis and dermis were incised down to the level of the subcutaneous fat.  Blunt dissection was used to identify the underlying plane of the iliotibial band.  The iliotibial band was split in line with the surgical incision extending this proximally into the gluteus rodolfo fascia.  This plane was retracted with use of a Charnley bow.  The posterior aspect of the adductors was identified.  These were retracted anteriorly.  Synovial fluid was obtained and sent to the Lab for cell count with culture as well as differential.  At this point, the previously placed femoral head was dislocated posteriorly.  The femoral head was removed.  The trunnion appeared to be intact.  There were no signs of obvious corrosion or damage to the trunnion.  The femur was not translated anterior.  The underlying acetabular component was able to be identified.  The polyethylene liner from the TASH cup demonstrated  gross motion at the locking mechanism.  There was also significant polyethylene wear noted.  The Explant osteotome for a size 50 mm cup was utilized.  This was utilized to fully remove the acetabular component with minimal associated bone loss.  Once the acetabular component had been removed, the acetabulum was assessed.  This was consistent with a type 2B acetabular defect.  The acetabulum was reamed with sequentially larger hemispheric reamers to a final reaming of 54 mm.  The 54 mm reamer engaged the anterior superior as well as the posterior inferior bone.  Subsequently, a 54 mm Empowr acetabular component was placed in approximately 45 degrees of verticality and 20 degrees of anteversion.  This was fully seated and gave excellent initial stability.  Then, 4-6.5 mm screws were placed throughout the posterior quadrant, providing excellent fixation.  Subsequently, a 54/44 dual-mobility liner was placed in the acetabular liner and fully seated.  At this point, a trial reduction was performed with a 28 mm, +6 femoral head along with a dual-mobility articulation.  The hip was able to be reduced.  It was brought through stable range of motion without evidence of impingement or instability.  The leg lengths appeared to be symmetric based on patellar height.  The dual-mobility polyethylene liner and 28 mm, +6 head were assembled on the back table.  This was inserted onto the trunnion.  This gave excellent stability at the level of trunnion.  At this point, the hip was reduced.  Surgical site was irrigated.  The posterior capsule was reapproximated to the posterior aspect of the adductors with interrupted #1 Vicryl suture.  The iliotibial band and gluteus rodolfo fascia were closed with interrupted #1 Vicryl suture.  Subcutaneous tissue was closed with 3-0 Vicryl, and the skin was closed with staples.  Wounds were then dressed with Aquacel dressing.  She was transferred to her previous hospital bed, was brought to the  recovery room in stable condition.  All counts were correct x2.  There appeared to be no intraoperative complications.    It should be noted that I utilized the assistance of an assistant who was present and necessary for the case.  They assisted with initial preoperative preparation, intraoperative assistance, final wound closure as well as application of dressing and splint.     Dictated By Pepe Aguero MD  d: 04/04/2024 19:51:19  t: 04/05/2024 03:07:02  Job [id number]  [facility]/

## 2024-04-05 NOTE — OCCUPATIONAL THERAPY NOTE
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    Patient continues to function below baseline with toileting, LE dressing, commode transfer.   Contributing factors to remaining limitations include limited reach, pain, nausea, fatigue.  Next session anticipate patient to progress toileting, LE dressing, commode transfers.  Occupational Therapy will continue to follow patient for duration of hospitalization.    Patient continues to benefit from continued skilled OT services: to promote return to prior level of function and safety with continuous assistance and gradual rehabilitative therapy .     PLAN  OT Treatment Plan: Balance activities;Energy conservation/work simplification techniques;ADL training;Functional transfer training;Endurance training;Compensatory technique education  OT Device Recommendations: Transfer tub bench; Reacher; Sock aid; Long-handled shoehorn; Long-handled sponge    SUBJECTIVE  \"I think I feel better today\"     OBJECTIVE  Precautions: LIDIA - posterior;Limb alert - left (wound vac, NO ACTIVE HIP ABDUCTION)    WEIGHT BEARING RESTRICTION  L Lower Extremity: Toe Touch Weight Bearing    PAIN ASSESSMENT  Rating: Unable to rate (moderate pain per pt report)  Location: left hip  Management Techniques: Activity promotion; Body mechanics    ACTIVITY TOLERANCE                         O2 SATURATIONS  Oxygen Therapy  SPO2% on Room Air at Rest: 111 (w/ activity)    ACTIVITIES OF DAILY LIVING ASSESSMENT  AM-PAC ‘6-Clicks’ Inpatient Daily Activity Short Form  How much help from another person does the patient currently need…  -   Putting on and taking off regular lower body clothing?: A Lot  -    Bathing (including washing, rinsing, drying)?: A Lot  -   Toileting, which includes using toilet, bedpan or urinal? : A Lot  -   Putting on and taking off regular upper body clothing?: A Little  -   Taking care of personal grooming such as brushing teeth?: A Little  -   Eating meals?: None    AM-PAC Score:  Score: 16  Approx Degree of Impairment: 53.32%  Standardized Score (AM-PAC Scale): 35.96  CMS Modifier (G-Code): CK    FUNCTIONAL TRANSFER ASSESSMENT  Sit to Stand: Edge of Bed  Edge of Bed: Moderate Assist (for t/f assist with RW, additional person providing physical assist / tactile cues as needed to lle to maintan ttwb)    BED MOBILITY  Supine to Sit : Maximum Assist    FUNCTIONAL ADL ASSESSMENT  Grooming Seated: Independent  LB Dressing Seated: Dependent    Skilled Therapy Provided: RN cleared pt for participation in occupational therapy session, which was completed in collaboration with physical therapy. Upon arrival, pt was supine in bed and agreeable to activity. Pt's supportive spouse Masood was present during session. Pt benefited from additional time, verbal cues, RW , LHAE to maximize participation.    Pt with fair tolerance towards activity --having pain, fatigue.  Reviewed precautions and wb status -- pt benefited from verbal and tactile cues to maintain TTWB during dynamic activity. Frequent breaks between activity due to fatigue and pain. To assess pt's safe participation during daily tasks while also providing intermittent education to maximize safety and participation, therapist facilitated the following: LE dressing at bed level, bed mobility, simulated commode t/f to bedside chair with RW, set-up grooming in sitting.   LHAE  - LE dressing education continued.     Discussed DME options for home -- WC, bedside commode. Pt not feeling confident about return home at time of treatment.        EDUCATION PROVIDED  Patient: Role of Occupational Therapy; Plan of Care; Functional Transfer Techniques;  Compensatory ADL Techniques  Patient's Response to Education: Verbalized Understanding; Returned Demonstration    The patient's Approx Degree of Impairment: 53.32% has been calculated based on documentation in the [facility] '6 clicks' Inpatient Daily Activity Short Form.  Research supports that patients with this level of impairment may benefit from REHAB.  Final disposition will be made by interdisciplinary medical team.    Patient End of Session: Up in chair;Needs met;Call light within reach;RN aware of session/findings;Family present    OT Goals:  Patient self-stated goal is: did not state      Patient will complete LE dressing with SBA  Comment:     Patient will complete toilet transfer with SBA  Comment:     Patient will complete self care task at sink level with SBA   Comment:    Patient will independently recall posterior hip precautions  Comment:            Goals  on: 3/18/24  Frequency: 3x/w    OT Session Time: 25 minutes  Self-Care Home Management: 15 minutes  Therapeutic Activity: 10 minutes

## 2024-04-05 NOTE — PROGRESS NOTES
[facility]  part of [facility]    Progress Note    Silvana Owusu Patient Status:  Inpatient    11/3/1955 MRN [id number]   Location [facility] 4W/SW/SE Attending SporePepe dixon MD   Hosp Day # 2 PCP No primary care provider on file.       Subjective:   Silvana Owusu is a(n) 68 year old female is moving slowly with pain today.    Objective:   Vital Signs:  Blood pressure 113/56, pulse 80, temperature 98.2 °F (36.8 °C), temperature source Oral, resp. rate 16, height 5' 5.5\" (1.664 m), weight 213 lb 3.2 oz (96.7 kg), SpO2 91%.     General: No acute distress. Alert and oriented x 3.  HEENT: Moist mucous membranes.   Neck: No lymphadenopathy.  No JVD. No carotid bruits.  Respiratory: Clear to auscultation bilaterally.  No wheezes. No rhonchi.  Cardiovascular: S1, S2.  Regular rate and rhythm.  No murmurs. Equal pulses   Abdomen: Soft, nontender, nondistended.  Positive bowel sounds. No rebound tenderness  Neurologic: No focal neurological deficits.  Musculoskeletal: Full range of motion of all extremities.  No swelling noted.  Integument: No lesions. No erythema.  Psychiatric: Appropriate mood and affect.      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)  S/P left total hip arthroplasty revision POD#2    GERD    Recent right anterior chest urticarial rash    Hx CVA    HTN    ARISTEO    PT/OT  Pain Rx  Observe rash for worsening to rule out Zoster  Plan for rehab tomorrow              Results:     Lab Results   Component Value Date    WBC 11.4 (H) 2024    HGB 11.9 (L) 2024    HCT 37.8 2024    .0 2024    CREATSERUM 0.70 2024    BUN 13 2024     2024    K 4.8 2024     2024    CO2 28.0 2024     (H) 2024    CA 8.7 2024    ALB 3.4 2024    ALKPHO 68 2024    BILT 0.5 2024    TP 6.2 2024    AST 56 (H) 2024    ALT 48 2024    ESRML 29  03/20/2024    CRP 1.70 (H) 03/20/2024       XR HIP W OR WO PELVIS 2 OR 3 VIEWS, LEFT (CPT=73502)    Result Date: 4/3/2024  CONCLUSION:   Expected postoperative changes from revision left total hip arthroplasty, described above.     Dictated by (CST): Gilles Dooley MD on 4/03/2024 at 7:03 PM     Finalized by (CST): Gilles Dooley MD on 4/03/2024 at 7:06 PM                       Tao Mojica DO  4/5/2024

## 2024-04-05 NOTE — PLAN OF CARE
Alert and oriented x4. Left hip revision. POD 2. Tele. Voiding stand pivot to rolling chair. Pain control. Wound vac in place. Abductor pillow. General diet. Plan for home vs [facility].  Problem: Patient Centered Care  Goal: Patient preferences are identified and integrated in the patient's plan of care  Description: Interventions:  - What would you like us to know as we care for you? This is my 5th hip surgery  - Provide timely, complete, and accurate information to patient/family  - Incorporate patient and family knowledge, values, beliefs, and cultural backgrounds into the planning and delivery of care  - Encourage patient/family to participate in care and decision-making at the level they choose  - Honor patient and family perspectives and choices  Outcome: Progressing     Problem: Patient/Family Goals  Goal: Patient/Family Long Term Goal  Description: Patient's Long Term Goal: To be discharged    Interventions:  - get clearance   - See additional Care Plan goals for specific interventions  Outcome: Progressing  Goal: Patient/Family Short Term Goal  Description: Patient's Short Term Goal: Manage pain    Interventions:   - monitor and assess pain   - See additional Care Plan goals for specific interventions  Outcome: Progressing     Problem: PAIN - ADULT  Goal: Verbalizes/displays adequate comfort level or patient's stated pain goal  Description: INTERVENTIONS:  - Encourage pt to monitor pain and request assistance  - Assess pain using appropriate pain scale  - Administer analgesics based on type and severity of pain and evaluate response  - Implement non-pharmacological measures as appropriate and evaluate response  - Consider cultural and social influences on pain and pain management  - Manage/alleviate anxiety  - Utilize distraction and/or relaxation techniques  - Monitor for opioid side effects  - Notify MD/LIP if interventions unsuccessful or patient reports new pain  - Anticipate increased pain with activity and  pre-medicate as appropriate  Outcome: Progressing     Problem: RISK FOR INFECTION - ADULT  Goal: Absence of fever/infection during anticipated neutropenic period  Description: INTERVENTIONS  - Monitor WBC  - Administer growth factors as ordered  - Implement neutropenic guidelines  Outcome: Progressing     Problem: SAFETY ADULT - FALL  Goal: Free from fall injury  Description: INTERVENTIONS:  - Assess pt frequently for physical needs  - Identify cognitive and physical deficits and behaviors that affect risk of falls.  - [place] fall precautions as indicated by assessment.  - Educate pt/family on patient safety including physical limitations  - Instruct pt to call for assistance with activity based on assessment  - Modify environment to reduce risk of injury  - Provide assistive devices as appropriate  - Consider OT/PT consult to assist with strengthening/mobility  - Encourage toileting schedule  Outcome: Progressing     Problem: DISCHARGE PLANNING  Goal: Discharge to home or other facility with appropriate resources  Description: INTERVENTIONS:  - Identify barriers to discharge w/pt and caregiver  - Include patient/family/discharge partner in discharge planning  - Arrange for needed discharge resources and transportation as appropriate  - Identify discharge learning needs (meds, wound care, etc)  - Arrange for interpreters to assist at discharge as needed  - Consider post-discharge preferences of patient/family/discharge partner  - Complete POLST form as appropriate  - Assess patient's ability to be responsible for managing their own health  - Refer to Case Management Department for coordinating discharge planning if the patient needs post-hospital services based on physician/LIP order or complex needs related to functional status, cognitive ability or social support system  Outcome: Progressing

## 2024-04-05 NOTE — PHYSICAL THERAPY NOTE
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    Patient continues to function below baseline with bed mobility, transfers, gait, standing prolonged periods, and TTWB status  .  Contributing factors to remaining limitations include decreased functional strength, decreased endurance/aerobic capacity, pain, impaired static and dynamic balance, decreased muscular endurance, and difficulty maintaining precautions.  Next session anticipate patient to progress bed mobility, transfers, gait, and standing prolonged periods.  Physical Therapy will continue to follow patient for duration of hospitalization.    Patient continues to benefit from continued skilled PT services: to promote return to prior level of function and safety with continuous assistance and gradual rehabilitative therapy .    PLAN  PT Treatment Plan: Bed mobility;Body mechanics;Coordination;Endurance;Patient education;Gait training;Transfer training;Balance training;Strengthening  Frequency (Obs): Daily    SUBJECTIVE  Pt was agreeable to therapy session.         OBJECTIVE  Precautions: LIDIA - posterior;Limb alert - left (wound vac, NO ACTIVE HIP ABDUCTION)    WEIGHT BEARING RESTRICTION           L Lower Extremity: Toe Touch Weight Bearing    PAIN ASSESSMENT   Rating:  (did not rate)  Location: L hip  Management Techniques: Activity promotion;Body mechanics;Relaxation;Repositioning    BALANCE  Static Sitting: Fair +  Dynamic Sitting: Fair  Static Standing: Poor  Dynamic Standing: Poor  ACTIVITY TOLERANCE                         O2 WALK       AM-PAC '6-Clicks' INPATIENT SHORT FORM - BASIC MOBILITY  How much  difficulty does the patient currently have...  Patient Difficulty: Turning over in bed (including adjusting bedclothes, sheets and blankets)?: A Lot   Patient Difficulty: Sitting down on and standing up from a chair with arms (e.g., wheelchair, bedside commode, etc.): A Lot   Patient Difficulty: Moving from lying on back to sitting on the side of the bed?: A Lot   How much help from another person does the patient currently need...   Help from Another: Moving to and from a bed to a chair (including a wheelchair)?: A Lot   Help from Another: Need to walk in hospital room?: A Lot   Help from Another: Climbing 3-5 steps with a railing?: Total     AM-PAC Score:  Raw Score: 11   Approx Degree of Impairment: 72.57%   Standardized Score (AM-PAC Scale): 33.86   CMS Modifier (G-Code): CL    FUNCTIONAL ABILITY STATUS  Functional Mobility/Gait Assessment  Gait Assistance: Moderate assistance (2nd person to maintain TTWB status)  Distance (ft): SPT  Assistive Device: Rolling walker  Pattern: L Decreased stance time (TTWB L LE)  Rolling: moderate assist x2  Supine to Sit: moderate assist x2  Sit to Supine:  NT  Sit to Stand: moderate assist x1 and 2nd person to assist with TTWB status     Additional Information: Pt is received in the bed and was cleared for therapy session. Pt with family member present throughout the session. Co treat session with ELIJAH Uribe. Pt is mod A x2 with bed mobility and to transfer to the EOB. Pt is with NO Active Hip Abduction and required assist with her L LE and her upper trunk to sit up. Pt required slow movements due to increased pain level. Pt sat EOB for a few minutes and denied any dizziness and light headedness. Pt educated and reviewed and maintained all her posterior hip precautions and TTWB status also. Pt was mod A x1 with sit<>stand transfers with the RW and performed SPT from the bed to the chair. Pt required a second person to assist pt in maintaining her TTWB status on her L LE. Pt with  difficulty maintain during the transfer. Pt again with slow movements due to increased pain. Pt is repositioned and left in the chair with all needs within reach. Reported to the RN on the status of the pt.     The patient's Approx Degree of Impairment: 72.57% has been calculated based on documentation in the [facility] '6 clicks' Inpatient Basic Mobility Short Form.  Research supports that patients with this level of impairment may benefit from Rehab.  Final disposition will be made by interdisciplinary medical team.      Patient End of Session: Up in chair;Needs met;Call light within reach;RN aware of session/findings;All patient questions and concerns addressed;Family present    CURRENT GOALS   Goals to be met by: 4/18/24  Patient Goal Patient's self-stated goal is: go home   Goal #1 Patient is able to demonstrate supine - sit EOB @ level: CGA   Goal #1   Current Status  Mod A x2   Goal #2 Patient is able to demonstrate transfers Sit to/from Stand at assistance level: SBA      Goal #2  Current Status  Mod A with RW and 2nd person to assist pt to maintain her TTWB status    Goal #3 Patient is able to ambulate 75 feet with assistive device at assistance level: SBA while maintaining LLE TTWB status   Goal #3   Current Status  SPT with the RW mod A and 2nd person to assist pt to maintain her TTWB status    Goal #4 Pending progress should patient return home   Patient will negotiate 6 stairs/one curb w/ assistive device and Min A   Goal #4   Current Status  NT   Goal #5 Patient verbalizes and/or demonstrates all precautions and safety concerns independently   Goal #5   Current Status  Ongoing   Goal #6 Patient independently performs home exercise program for ROM/strengthening per the instructions provided in preparation for discharge.   Goal #6  Current Status  Ongoing       Therapeutic Activity: 30 minutes

## 2024-04-05 NOTE — PLAN OF CARE
Patient alert and oriented x 4. Post op day 2. Tramadol administered for pain as needed. Dressing is wound vac dressing. Up with 1x stand-pivot w/ rolling chair. Voiding freely. Patients diet is general. SCDs and ASA for VTE prophylaxis. Vital signs monitored. Cough and deep breathe in addition to incentive . Bed in lowest position, call light within reach, and non skid socks in place for fall precautions. All needs within reach.  at bedside. Rounding done by nursing staff. Plan for discharge is rehab pending choice      Problem: Patient Centered Care  Goal: Patient preferences are identified and integrated in the patient's plan of care  Description: Interventions:  - What would you like us to know as we care for you? This is my 5th hip surgery  - Provide timely, complete, and accurate information to patient/family  - Incorporate patient and family knowledge, values, beliefs, and cultural backgrounds into the planning and delivery of care  - Encourage patient/family to participate in care and decision-making at the level they choose  - Honor patient and family perspectives and choices  Outcome: Progressing     Problem: Patient/Family Goals  Goal: Patient/Family Long Term Goal  Description: Patient's Long Term Goal: To be discharged    Interventions:  - get clearance   - See additional Care Plan goals for specific interventions  Outcome: Progressing  Goal: Patient/Family Short Term Goal  Description: Patient's Short Term Goal: Manage pain    Interventions:   - monitor and assess pain   - See additional Care Plan goals for specific interventions  Outcome: Progressing     Problem: PAIN - ADULT  Goal: Verbalizes/displays adequate comfort level or patient's stated pain goal  Description: INTERVENTIONS:  - Encourage pt to monitor pain and request assistance  - Assess pain using appropriate pain scale  - Administer analgesics based on type and severity of pain and evaluate response  - Implement non-pharmacological  measures as appropriate and evaluate response  - Consider cultural and social influences on pain and pain management  - Manage/alleviate anxiety  - Utilize distraction and/or relaxation techniques  - Monitor for opioid side effects  - Notify MD/LIP if interventions unsuccessful or patient reports new pain  - Anticipate increased pain with activity and pre-medicate as appropriate  Outcome: Progressing     Problem: RISK FOR INFECTION - ADULT  Goal: Absence of fever/infection during anticipated neutropenic period  Description: INTERVENTIONS  - Monitor WBC  - Administer growth factors as ordered  - Implement neutropenic guidelines  Outcome: Progressing     Problem: SAFETY ADULT - FALL  Goal: Free from fall injury  Description: INTERVENTIONS:  - Assess pt frequently for physical needs  - Identify cognitive and physical deficits and behaviors that affect risk of falls.  - [place] fall precautions as indicated by assessment.  - Educate pt/family on patient safety including physical limitations  - Instruct pt to call for assistance with activity based on assessment  - Modify environment to reduce risk of injury  - Provide assistive devices as appropriate  - Consider OT/PT consult to assist with strengthening/mobility  - Encourage toileting schedule  Outcome: Progressing     Problem: DISCHARGE PLANNING  Goal: Discharge to home or other facility with appropriate resources  Description: INTERVENTIONS:  - Identify barriers to discharge w/pt and caregiver  - Include patient/family/discharge partner in discharge planning  - Arrange for needed discharge resources and transportation as appropriate  - Identify discharge learning needs (meds, wound care, etc)  - Arrange for interpreters to assist at discharge as needed  - Consider post-discharge preferences of patient/family/discharge partner  - Complete POLST form as appropriate  - Assess patient's ability to be responsible for managing their own health  - Refer to Case Management  Department for coordinating discharge planning if the patient needs post-hospital services based on physician/LIP order or complex needs related to functional status, cognitive ability or social support system  Outcome: Progressing

## 2024-04-05 NOTE — CM/SW NOTE
SW followed up on DC planning.     SNF list provided - wants more choices    More facilities added to the list, pending if they can accept    PLAN: DC to SNF - pending accepting facilites/ choice    Cayla Orosco, JEFFW, MSW ext. [phone]

## 2024-04-05 NOTE — CONGREGATE LIVING REVIEW
[facility] Living Authorization    The [facility] Review Committee has reviewed this case and the patient IS APPROVED for discharge to a facility for Short Term Skilled once the following procedure is followed:     - The physician discharge instructions (contained within the ANA note for SNF) must inlcude the below appropriate and approved COVID instructions to the facility    For questions regarding CLRC approval process, please contact the CM assigned to the case.  For questions regarding RN discharge workflow, please contact the unit Clinical Leader.

## 2024-04-06 PROCEDURE — 97530 THERAPEUTIC ACTIVITIES: CPT

## 2024-04-06 PROCEDURE — 97110 THERAPEUTIC EXERCISES: CPT

## 2024-04-06 NOTE — PHYSICAL THERAPY NOTE
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    Patient continues to function below baseline with bed mobility, transfers, and gait.  Contributing factors to remaining limitations include decreased functional strength, decreased endurance/aerobic capacity, and pain.  Next session anticipate patient to progress bed mobility, transfers, and gait.  Physical Therapy will continue to follow patient for duration of hospitalization.    Patient continues to benefit from continued skilled PT services: to promote return to prior level of function and safety with continuous assistance and gradual rehabilitative therapy .    PLAN  PT Treatment Plan: Bed mobility;Body mechanics;Endurance;Patient education;Family education  Frequency (Obs): Daily    SUBJECTIVE    Pt reports being ready for PT RX    OBJECTIVE  Precautions: Bed/chair alarm    WEIGHT BEARING RESTRICTION           L Lower Extremity: Toe Touch Weight Bearing    PAIN ASSESSMENT   Ratin  Location: L hip  Management Techniques: Activity promotion;Body mechanics;Relaxation;Repositioning    BALANCE  Static Sitting: Fair +  Dynamic Sitting: Fair  Static Standing: Poor  Dynamic Standing: Poor    ACTIVITY TOLERANCE                          O2 WALK       AM-PAC '6-Clicks' INPATIENT SHORT FORM - BASIC MOBILITY  How much difficulty does the patient currently have...  Patient Difficulty: Turning over in bed (including adjusting bedclothes, sheets and blankets)?: A Lot   Patient  Difficulty: Sitting down on and standing up from a chair with arms (e.g., wheelchair, bedside commode, etc.): A Lot   Patient Difficulty: Moving from lying on back to sitting on the side of the bed?: A Lot   How much help from another person does the patient currently need...   Help from Another: Moving to and from a bed to a chair (including a wheelchair)?: A Lot   Help from Another: Need to walk in hospital room?: A Lot   Help from Another: Climbing 3-5 steps with a railing?: Total     AM-PAC Score:  Raw Score: 11   Approx Degree of Impairment: 72.57%   Standardized Score (AM-PAC Scale): 33.86   CMS Modifier (G-Code): CL    FUNCTIONAL ABILITY STATUS  Functional Mobility/Gait Assessment  Gait Assistance: Moderate assistance  Distance (ft): SPT  Assistive Device: Rolling walker  Pattern: L Decreased stance time  Rolling: minimal assist  Supine to Sit: minimal assist  Sit to Supine: minimal assist  Sit to Stand: minimal assist    Additional information: Pt seen daily.Mod a for bed mobility and transfer.Extra time provided to complete task.Family present;family education;all questions and concerns addressed.Pt educated on TTWB status with functional mobility.Mod a for SPT bed to rolling chair;TTWB status maintain.Assisted pt to the bathroom.There ex.    The patient's Approx Degree of Impairment: 72.57% has been calculated based on documentation in the [facility] '6 clicks' Inpatient Daily Activity Short Form.  Research supports that patients with this level of impairment may benefit from Sub-acute Rehabilitation.  Final disposition will be made by interdisciplinary medical team.    THERAPEUTIC EXERCISES  Lower Extremity Ankle pumps  Glut sets  Quad sets     Position Supine       Patient End of Session: Up in chair;Call light within reach;RN aware of session/findings;All patient questions and concerns addressed    CURRENT GOALS   Patient Goal Patient's self-stated goal is: go home   Goal #1 Patient is able to demonstrate  supine - sit EOB @ level: CGA   Goal #1   Current Status  Mod A   Goal #2 Patient is able to demonstrate transfers Sit to/from Stand at assistance level: SBA      Goal #2  Current Status  Mod A with RW and 2nd person to assist pt to maintain her TTWB status    Goal #3 Patient is able to ambulate 75 feet with assistive device at assistance level: SBA while maintaining LLE TTWB status   Goal #3   Current Status  SPT with the RW mod A ;pt  maintain her TTWB status    Goal #4 Pending progress should patient return home   Patient will negotiate 6 stairs/one curb w/ assistive device and Min A   Goal #4   Current Status  NT   Goal #5 Patient verbalizes and/or demonstrates all precautions and safety concerns independently   Goal #5   Current Status  Ongoing   Goal #6 Patient independently performs home exercise program for ROM/strengthening per the instructions provided in preparation for discharge.   Goal #6  Current Status  Ongoing     Gait Training:  minutes  Therapeutic Activity: 20 minutes  Neuromuscular Re-education:  minutes  Therapeutic Exercise: 10 minutes  [place] Repositioning:  minutes  Manual Therapy:  minutes  Can add/delete any of these

## 2024-04-06 NOTE — PROGRESS NOTES
[facility]  part of [facility]    Progress Note    Silvana Owusu Patient Status:  Inpatient    11/3/1955 MRN [id number]   Location [facility] 4W/SW/SE Attending SporePepe dixon MD   Hosp Day # 3 PCP No primary care provider on file.       Subjective:   Silvana Owusu is a(n) 68 year old female POD# 2 Feels she is moving slow. No chest pain, dyspnea or nausea.     Objective:   Vital Signs:  Blood pressure 113/59, pulse 80, temperature 98.7 °F (37.1 °C), temperature source Oral, resp. rate 18, height 5' 5.5\" (1.664 m), weight 213 lb 3.2 oz (96.7 kg), SpO2 96%.     General: No acute distress. Alert and oriented x 3.  HEENT: Moist mucous membranes. EOM-I. PERRL  Neck: No lymphadenopathy.  No JVD. No carotid bruits.  Respiratory: Clear to auscultation bilaterally.  No wheezes. No rhonchi.  Cardiovascular: S1, S2.  Regular rate and rhythm.  No murmurs.  Abdomen: Soft, nontender, nondistended.   Neurologic: No focal neurological deficits.  Musculoskeletal: No calf tenderness.   Integument: No lesions. No erythema.  Psychiatric: Appropriate mood and affect.      Results:    Lab Results   Component Value Date    WBC 11.4 (H) 2024    HGB 11.9 (L) 2024    HCT 37.8 2024    .0 2024    CREATSERUM 0.70 2024    BUN 13 2024     2024    K 4.8 2024     2024    CO2 28.0 2024     (H) 2024    CA 8.7 2024    ALB 3.4 2024    ALKPHO 68 2024    BILT 0.5 2024    TP 6.2 2024    AST 56 (H) 2024    ALT 48 2024    ESRML 29 2024    CRP 1.70 (H) 2024         No results found.        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 Pt undecided with plans for discharge. Medically stable. May discharge when passes therapy and plans made for discharge.       History of CVA  (cerebrovascular accident)        Primary hypertension        High cholesterol        ARISTEO on CPAP        Obesity (BMI 30.0-34.9)        Prediabetes                Postoperative Recommendations:  Anticoagulation / DVT prophylaxis: SCDs, early ambulation. ASA 81 mg twice daily with food for four weeks.    Pain Control: per ortho  GI protection: Protonix  Incentive Spirometry   Telemetry as needed  CPAP/O2 as needed       Pain management and Physical therapy as per Orthopedic service.   Home Health as needed          Kelvin Gant MD  4/6/2024

## 2024-04-06 NOTE — PLAN OF CARE
Patient A&Ox4. Post-op day #3. Dressing in place to Left hip. Monitoring vital signs- stable at this time. No acute changes noted throughout shift. Tolerating diet. Voiding freely. SCDs and ASA for DVT prophylaxis. Tylenol scheduled and pain medication provided as needed. Up stand pivot to the rolling chair with a walker, TTWB. Encouraged frequent ambulation and use of incentive spirometer. Fall precautions maintained- bed alarm on, bed locked in lowest position, call light and personal belongings within reach, non-skid socks in place to bilateral feet. Frequent rounding by nursing staff. Plan for SNF, pending choice.       Problem: Patient Centered Care  Goal: Patient preferences are identified and integrated in the patient's plan of care  Description: Interventions:  - What would you like us to know as we care for you? This is my 5th hip surgery  - Provide timely, complete, and accurate information to patient/family  - Incorporate patient and family knowledge, values, beliefs, and cultural backgrounds into the planning and delivery of care  - Encourage patient/family to participate in care and decision-making at the level they choose  - Honor patient and family perspectives and choices  Outcome: Progressing     Problem: Patient/Family Goals  Goal: Patient/Family Long Term Goal  Description: Patient's Long Term Goal: To be discharged    Interventions:  - get clearance   - See additional Care Plan goals for specific interventions  Outcome: Progressing  Goal: Patient/Family Short Term Goal  Description: Patient's Short Term Goal: Manage pain    Interventions:   - monitor and assess pain   - See additional Care Plan goals for specific interventions  Outcome: Progressing     Problem: PAIN - ADULT  Goal: Verbalizes/displays adequate comfort level or patient's stated pain goal  Description: INTERVENTIONS:  - Encourage pt to monitor pain and request assistance  - Assess pain using appropriate pain scale  - Administer  analgesics based on type and severity of pain and evaluate response  - Implement non-pharmacological measures as appropriate and evaluate response  - Consider cultural and social influences on pain and pain management  - Manage/alleviate anxiety  - Utilize distraction and/or relaxation techniques  - Monitor for opioid side effects  - Notify MD/LIP if interventions unsuccessful or patient reports new pain  - Anticipate increased pain with activity and pre-medicate as appropriate  Outcome: Progressing     Problem: RISK FOR INFECTION - ADULT  Goal: Absence of fever/infection during anticipated neutropenic period  Description: INTERVENTIONS  - Monitor WBC  - Administer growth factors as ordered  - Implement neutropenic guidelines  Outcome: Progressing     Problem: SAFETY ADULT - FALL  Goal: Free from fall injury  Description: INTERVENTIONS:  - Assess pt frequently for physical needs  - Identify cognitive and physical deficits and behaviors that affect risk of falls.  - [place] fall precautions as indicated by assessment.  - Educate pt/family on patient safety including physical limitations  - Instruct pt to call for assistance with activity based on assessment  - Modify environment to reduce risk of injury  - Provide assistive devices as appropriate  - Consider OT/PT consult to assist with strengthening/mobility  - Encourage toileting schedule  Outcome: Progressing     Problem: DISCHARGE PLANNING  Goal: Discharge to home or other facility with appropriate resources  Description: INTERVENTIONS:  - Identify barriers to discharge w/pt and caregiver  - Include patient/family/discharge partner in discharge planning  - Arrange for needed discharge resources and transportation as appropriate  - Identify discharge learning needs (meds, wound care, etc)  - Arrange for interpreters to assist at discharge as needed  - Consider post-discharge preferences of patient/family/discharge partner  - Complete POLST form as appropriate  -  Assess patient's ability to be responsible for managing their own health  - Refer to Case Management Department for coordinating discharge planning if the patient needs post-hospital services based on physician/LIP order or complex needs related to functional status, cognitive ability or social support system  Outcome: Progressing

## 2024-04-06 NOTE — PLAN OF CARE
Post-op day 3. Alert and oriented x4 on room air. No acute changes overnight. Denies numbness/tingling. PRN tramadol and scheduled tylenol for pain. SCDs and ASA for DVT prophylaxis. Up x1 stand and pivot to rolling chair. Call light within reach. Plan for SNF pending choice.    Problem: Patient Centered Care  Goal: Patient preferences are identified and integrated in the patient's plan of care  Description: Interventions:  - What would you like us to know as we care for you? This is my 5th hip surgery  - Provide timely, complete, and accurate information to patient/family  - Incorporate patient and family knowledge, values, beliefs, and cultural backgrounds into the planning and delivery of care  - Encourage patient/family to participate in care and decision-making at the level they choose  - Honor patient and family perspectives and choices  Outcome: Progressing     Problem: Patient/Family Goals  Goal: Patient/Family Long Term Goal  Description: Patient's Long Term Goal: To be discharged    Interventions:  - get clearance   - See additional Care Plan goals for specific interventions  Outcome: Progressing  Goal: Patient/Family Short Term Goal  Description: Patient's Short Term Goal: Manage pain    Interventions:   - monitor and assess pain   - See additional Care Plan goals for specific interventions  Outcome: Progressing     Problem: PAIN - ADULT  Goal: Verbalizes/displays adequate comfort level or patient's stated pain goal  Description: INTERVENTIONS:  - Encourage pt to monitor pain and request assistance  - Assess pain using appropriate pain scale  - Administer analgesics based on type and severity of pain and evaluate response  - Implement non-pharmacological measures as appropriate and evaluate response  - Consider cultural and social influences on pain and pain management  - Manage/alleviate anxiety  - Utilize distraction and/or relaxation techniques  - Monitor for opioid side effects  - Notify MD/LIP if  interventions unsuccessful or patient reports new pain  - Anticipate increased pain with activity and pre-medicate as appropriate  Outcome: Progressing     Problem: RISK FOR INFECTION - ADULT  Goal: Absence of fever/infection during anticipated neutropenic period  Description: INTERVENTIONS  - Monitor WBC  - Administer growth factors as ordered  - Implement neutropenic guidelines  Outcome: Progressing     Problem: SAFETY ADULT - FALL  Goal: Free from fall injury  Description: INTERVENTIONS:  - Assess pt frequently for physical needs  - Identify cognitive and physical deficits and behaviors that affect risk of falls.  - [place] fall precautions as indicated by assessment.  - Educate pt/family on patient safety including physical limitations  - Instruct pt to call for assistance with activity based on assessment  - Modify environment to reduce risk of injury  - Provide assistive devices as appropriate  - Consider OT/PT consult to assist with strengthening/mobility  - Encourage toileting schedule  Outcome: Progressing     Problem: DISCHARGE PLANNING  Goal: Discharge to home or other facility with appropriate resources  Description: INTERVENTIONS:  - Identify barriers to discharge w/pt and caregiver  - Include patient/family/discharge partner in discharge planning  - Arrange for needed discharge resources and transportation as appropriate  - Identify discharge learning needs (meds, wound care, etc)  - Arrange for interpreters to assist at discharge as needed  - Consider post-discharge preferences of patient/family/discharge partner  - Complete POLST form as appropriate  - Assess patient's ability to be responsible for managing their own health  - Refer to Case Management Department for coordinating discharge planning if the patient needs post-hospital services based on physician/LIP order or complex needs related to functional status, cognitive ability or social support system  Outcome: Progressing

## 2024-04-07 PROCEDURE — 97530 THERAPEUTIC ACTIVITIES: CPT

## 2024-04-07 NOTE — PROGRESS NOTES
[facility]  part of [facility]    Progress Note    Silvana Owusu Patient Status:  Inpatient    11/3/1955 MRN [id number]   Location [facility] 4W/SW/SE Attending Pepe Aguero MD   Hosp Day # 4 PCP No primary care provider on file.       Subjective:   Silvana Owusu is a(n) 68 year old female POD# 3 Feels she is doing better. No chest pain, dyspnea. She did not have an answer as to where she will go on discharge.     Objective:   Vital Signs:  Blood pressure 115/66, pulse 73, temperature 98.6 °F (37 °C), temperature source Oral, resp. rate 18, height 5' 5.5\" (1.664 m), weight 213 lb 3.2 oz (96.7 kg), SpO2 96%.     General: No acute distress. Alert and oriented x 3.  HEENT: Moist mucous membranes. EOM-I. PERRL  Respiratory: Clear to auscultation bilaterally.  No wheezes. No rhonchi.  Cardiovascular: S1, S2.  Regular rate and rhythm.  No murmurs.  Abdomen: Soft, nontender, nondistended.   Neurologic: No focal neurological deficits.  Musculoskeletal: No calf tenderness.   Integument: No lesions. No erythema.  Psychiatric: Appropriate mood and affect.      Results:    Lab Results   Component Value Date    WBC 11.4 (H) 2024    HGB 11.9 (L) 2024    HCT 37.8 2024    .0 2024    CREATSERUM 0.70 2024    BUN 13 2024     2024    K 4.8 2024     2024    CO2 28.0 2024     (H) 2024    CA 8.7 2024    ALB 3.4 2024    ALKPHO 68 2024    BILT 0.5 2024    TP 6.2 2024    AST 56 (H) 2024    ALT 48 2024    ESRML 29 2024    CRP 1.70 (H) 2024         No results found.        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 Slow recovery. Uncertain with discharge plans.       History of CVA (cerebrovascular accident)        Primary hypertension        High  cholesterol        ARISTEO on CPAP        Obesity (BMI 30.0-34.9)        Prediabetes                Postoperative Recommendations:  Anticoagulation / DVT prophylaxis: SCDs, early ambulation. ASA 81 mg twice daily with food for four weeks.    Pain Control: per ortho  GI protection: Protonix  Incentive Spirometry   Telemetry as needed  CPAP/O2 as needed       Pain management and Physical therapy as per Orthopedic service.   Home Health as needed          Kelvin Gant MD  4/7/2024

## 2024-04-07 NOTE — PLAN OF CARE
Patient alert and orientated x4. VSS. JENNIFER. DEBORA.  Remote tele. Voiding freely. On scheduled tylenol. POD #4- dressing in place to left hip-prevena in place. Tolerating general diet. Plan to discharge to rehab, pt to [facility] facility.  following for dc needs.          Problem: Patient Centered Care  Goal: Patient preferences are identified and integrated in the patient's plan of care  Description: Interventions:  - What would you like us to know as we care for you? This is my 5th hip surgery  - Provide timely, complete, and accurate information to patient/family  - Incorporate patient and family knowledge, values, beliefs, and cultural backgrounds into the planning and delivery of care  - Encourage patient/family to participate in care and decision-making at the level they choose  - Honor patient and family perspectives and choices  Outcome: Progressing     Problem: Patient/Family Goals  Goal: Patient/Family Long Term Goal  Description: Patient's Long Term Goal: To be discharged    Interventions:  - get clearance   - See additional Care Plan goals for specific interventions  Outcome: Progressing  Goal: Patient/Family Short Term Goal  Description: Patient's Short Term Goal: Manage pain    Interventions:   - monitor and assess pain   - See additional Care Plan goals for specific interventions  Outcome: Progressing     Problem: PAIN - ADULT  Goal: Verbalizes/displays adequate comfort level or patient's stated pain goal  Description: INTERVENTIONS:  - Encourage pt to monitor pain and request assistance  - Assess pain using appropriate pain scale  - Administer analgesics based on type and severity of pain and evaluate response  - Implement non-pharmacological measures as appropriate and evaluate response  - Consider cultural and social influences on pain and pain management  - Manage/alleviate anxiety  - Utilize distraction and/or relaxation techniques  - Monitor for opioid side effects  - Notify MD/LIP if  interventions unsuccessful or patient reports new pain  - Anticipate increased pain with activity and pre-medicate as appropriate  Outcome: Progressing     Problem: RISK FOR INFECTION - ADULT  Goal: Absence of fever/infection during anticipated neutropenic period  Description: INTERVENTIONS  - Monitor WBC  - Administer growth factors as ordered  - Implement neutropenic guidelines  Outcome: Progressing     Problem: SAFETY ADULT - FALL  Goal: Free from fall injury  Description: INTERVENTIONS:  - Assess pt frequently for physical needs  - Identify cognitive and physical deficits and behaviors that affect risk of falls.  - [place] fall precautions as indicated by assessment.  - Educate pt/family on patient safety including physical limitations  - Instruct pt to call for assistance with activity based on assessment  - Modify environment to reduce risk of injury  - Provide assistive devices as appropriate  - Consider OT/PT consult to assist with strengthening/mobility  - Encourage toileting schedule  Outcome: Progressing     Problem: DISCHARGE PLANNING  Goal: Discharge to home or other facility with appropriate resources  Description: INTERVENTIONS:  - Identify barriers to discharge w/pt and caregiver  - Include patient/family/discharge partner in discharge planning  - Arrange for needed discharge resources and transportation as appropriate  - Identify discharge learning needs (meds, wound care, etc)  - Arrange for interpreters to assist at discharge as needed  - Consider post-discharge preferences of patient/family/discharge partner  - Complete POLST form as appropriate  - Assess patient's ability to be responsible for managing their own health  - Refer to Case Management Department for coordinating discharge planning if the patient needs post-hospital services based on physician/LIP order or complex needs related to functional status, cognitive ability or social support system  Outcome: Progressing

## 2024-04-07 NOTE — PHYSICAL THERAPY NOTE
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    Patient continues to function below baseline with bed mobility, transfers, gait, and standing prolonged periods.  Contributing factors to remaining limitations include decreased functional strength, decreased endurance/aerobic capacity, pain, decreased muscular endurance, and WB status  .  Next session anticipate patient to progress bed mobility, transfers, gait, and standing prolonged periods.  Physical Therapy will continue to follow patient for duration of hospitalization.    Patient continues to benefit from continued skilled PT services: to promote return to prior level of function and safety with continuous assistance and gradual rehabilitative therapy .    PLAN  PT Treatment Plan: Bed mobility;Body mechanics;Coordination;Endurance;Patient education;Gait training;Strengthening;Transfer training;Balance training  Frequency (Obs): Daily    SUBJECTIVE  Pt was agreeable to therapy session.         OBJECTIVE  Precautions: Bed/chair alarm    WEIGHT BEARING RESTRICTION           L Lower Extremity: Toe Touch Weight Bearing    PAIN ASSESSMENT   Rating:  (did not rate)  Location: L hip  Management Techniques: Activity promotion;Body mechanics;Relaxation;Repositioning    BALANCE  Static Sitting: Fair +  Dynamic Sitting: Fair  Static Standing: Poor +  Dynamic Standing: Poor +  ACTIVITY TOLERANCE                         O2 WALK       AM-PAC '6-Clicks' INPATIENT SHORT FORM  - BASIC MOBILITY  How much difficulty does the patient currently have...  Patient Difficulty: Turning over in bed (including adjusting bedclothes, sheets and blankets)?: A Lot   Patient Difficulty: Sitting down on and standing up from a chair with arms (e.g., wheelchair, bedside commode, etc.): A Little   Patient Difficulty: Moving from lying on back to sitting on the side of the bed?: A Lot   How much help from another person does the patient currently need...   Help from Another: Moving to and from a bed to a chair (including a wheelchair)?: A Little   Help from Another: Need to walk in hospital room?: A Lot   Help from Another: Climbing 3-5 steps with a railing?: Total     AM-PAC Score:  Raw Score: 13   Approx Degree of Impairment: 64.91%   Standardized Score (AM-PAC Scale): 36.74   CMS Modifier (G-Code): CL    FUNCTIONAL ABILITY STATUS  Functional Mobility/Gait Assessment  Gait Assistance: Minimum assistance  Distance (ft): SPT  Assistive Device: Rolling walker  Pattern: L Decreased stance time (L LE TTWB)  Rolling:  NT  Supine to Sit:  NT  Sit to Supine:  NT  Sit to Stand: minimal assist    Additional Information: Pt was received in the commode chair with her  present and was cleared for therapy session. Reviewed pt TTWB status and with good understanding. Pt is min A with sit<>stand transfers with the RW. Pt is cued for proper technique and sequencing. Pt was able to perform a SPT from the commode chair to the bs chair with the RW min A for balance and safety. Pt was repositioned and left in the chair with all needs within reach. Reported to the RN on the status of the pt.     The patient's Approx Degree of Impairment: 64.91% has been calculated based on documentation in the [facility] '6 clicks' Inpatient Basic Mobility Short Form.  Research supports that patients with this level of impairment may benefit from Rehab.  Final disposition will be made by interdisciplinary medical team.      Patient End of  Session: Up in chair;Needs met;Call light within reach;RN aware of session/findings;All patient questions and concerns addressed;Family present    CURRENT GOALS   Goals to be met by: 4/18/24  Patient Goal Patient's self-stated goal is: go home   Goal #1 Patient is able to demonstrate supine - sit EOB @ level: CGA   Goal #1   Current Status NT received in the commode chair   Goal #2 Patient is able to demonstrate transfers Sit to/from Stand at assistance level: SBA      Goal #2  Current Status  min A with the RW   Goal #3 Patient is able to ambulate 75 feet with assistive device at assistance level: SBA while maintaining LLE TTWB status   Goal #3   Current Status  SPT with the RW min A TTWB maintained    Goal #4 Pending progress should patient return home   Patient will negotiate 6 stairs/one curb w/ assistive device and Min A   Goal #4   Current Status  NT   Goal #5 Patient verbalizes and/or demonstrates all precautions and safety concerns independently   Goal #5   Current Status  Ongoing   Goal #6 Patient independently performs home exercise program for ROM/strengthening per the instructions provided in preparation for discharge.   Goal #6  Current Status  Ongoing       Therapeutic Activity: 15 minutes

## 2024-04-08 PROCEDURE — 97530 THERAPEUTIC ACTIVITIES: CPT

## 2024-04-08 NOTE — PROGRESS NOTES
[facility]  part of [facility]    Ortho Medical Progress Note     Silvana Owusu Patient Status:  Inpatient    11/3/1955 MRN [id number]   Location [facility] 4W/SW/SE Attending SporePepe dixon MD   Hosp Day # 5 PCP No primary care provider on file.       Subjective:   Silvana Owusu is a(n) 68 year old female post operative left hip revision arthroplasty per Dr. Hart post op day # 4.  Denies nausea, SOB, chest pain, dizziness.  Toe touch.   The surgical pain is controlled.  The patient has been up with PT and tolerated fair but planning for rehab and waiting for clearance.   is present.  Objective:   Vital Signs:  Blood pressure 129/67, pulse 77, temperature 98.2 °F (36.8 °C), temperature source Oral, resp. rate 16, height 5' 5.5\" (1.664 m), weight 213 lb 3.2 oz (96.7 kg), SpO2 99%.     General: No acute distress. Appropriate   HEENT: Moist mucous membranes. PERRL  Respiratory: Clear to auscultation bilaterally.  No wheezes. No rhonchi.   Cardiovascular: S1, S2.  Regular rate and rhythm.    Abdomen: Soft, nontender, nondistended.  Positive bowel sounds   Neurologic: Oriented and alert. Sensation present in bilateral lower extremities   Musculoskeletal: Surgical dressing dry and intact.  Dorsi and plantar flexion present. No calf tenderness.  no ankle edema  Skin: No lesions. No erythema. Color normal  Psychiatric: Appropriate mood and affect.      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)  Pod #4 stable and waiting for rehab      Obesity (BMI 30.0-34.9)        History of CVA (cerebrovascular accident)        Primary hypertension        High cholesterol        ARISTEO on CPAP        Prediabetes        S/P revision of total hip          Results:     Lab Results   Component Value Date    WBC 11.4 (H) 2024    HGB 11.9 (L) 2024    HCT 37.8 2024    .0 2024    CREATSERUM 0.70 2024    BUN 13 2024      04/04/2024    K 4.8 04/04/2024     04/04/2024    CO2 28.0 04/04/2024     (H) 04/04/2024    CA 8.7 04/04/2024    ALB 3.4 04/04/2024    ALKPHO 68 04/04/2024    BILT 0.5 04/04/2024    TP 6.2 04/04/2024    AST 56 (H) 04/04/2024    ALT 48 04/04/2024    ESRML 29 03/20/2024    CRP 1.70 (H) 03/20/2024       No results found.              Recommendations:  PT/OT: per ortho  Pain Control: per ortho -  Incentive Spirometry  DVT Prophylaxis -  GI Prophylaxis -  Discharge plans - rehab    The patient's condition was discussed with Dr. Mojica.    Discussed discharge and the eclipe . The patient will call Dr. Mojica or team for any medical questions after discharged.    Is this a shared or split note between Advanced Practice Provider and Physician? No     AMIE To  Office [phone]  Cell [phone]  4/8/2024

## 2024-04-08 NOTE — PLAN OF CARE
No acute changes overnite.  VSS.  Voiding .   Stand pivot to rolling chair.  Discharge plan is for rehab pending choice and approval. Prevena drain in continuous operation. Remote tele no calls.   Problem: Patient Centered Care  Goal: Patient preferences are identified and integrated in the patient's plan of care  Description: Interventions:  - What would you like us to know as we care for you? This is my 5th hip surgery  - Provide timely, complete, and accurate information to patient/family  - Incorporate patient and family knowledge, values, beliefs, and cultural backgrounds into the planning and delivery of care  - Encourage patient/family to participate in care and decision-making at the level they choose  - Honor patient and family perspectives and choices  Outcome: Progressing     Problem: Patient/Family Goals  Goal: Patient/Family Long Term Goal  Description: Patient's Long Term Goal: To be discharged    Interventions:  - get clearance   - See additional Care Plan goals for specific interventions  Outcome: Progressing  Goal: Patient/Family Short Term Goal  Description: Patient's Short Term Goal: Manage pain    Interventions:   - monitor and assess pain   - See additional Care Plan goals for specific interventions  Outcome: Progressing     Problem: PAIN - ADULT  Goal: Verbalizes/displays adequate comfort level or patient's stated pain goal  Description: INTERVENTIONS:  - Encourage pt to monitor pain and request assistance  - Assess pain using appropriate pain scale  - Administer analgesics based on type and severity of pain and evaluate response  - Implement non-pharmacological measures as appropriate and evaluate response  - Consider cultural and social influences on pain and pain management  - Manage/alleviate anxiety  - Utilize distraction and/or relaxation techniques  - Monitor for opioid side effects  - Notify MD/LIP if interventions unsuccessful or patient reports new pain  - Anticipate increased pain  with activity and pre-medicate as appropriate  Outcome: Progressing     Problem: RISK FOR INFECTION - ADULT  Goal: Absence of fever/infection during anticipated neutropenic period  Description: INTERVENTIONS  - Monitor WBC  - Administer growth factors as ordered  - Implement neutropenic guidelines  Outcome: Progressing     Problem: SAFETY ADULT - FALL  Goal: Free from fall injury  Description: INTERVENTIONS:  - Assess pt frequently for physical needs  - Identify cognitive and physical deficits and behaviors that affect risk of falls.  - [place] fall precautions as indicated by assessment.  - Educate pt/family on patient safety including physical limitations  - Instruct pt to call for assistance with activity based on assessment  - Modify environment to reduce risk of injury  - Provide assistive devices as appropriate  - Consider OT/PT consult to assist with strengthening/mobility  - Encourage toileting schedule  Outcome: Progressing

## 2024-04-08 NOTE — PLAN OF CARE
Problem: PAIN - ADULT  Goal: Verbalizes/displays adequate comfort level or patient's stated pain goal  Description: INTERVENTIONS:  - Encourage pt to monitor pain and request assistance  - Assess pain using appropriate pain scale  - Administer analgesics based on type and severity of pain and evaluate response  - Implement non-pharmacological measures as appropriate and evaluate response  - Consider cultural and social influences on pain and pain management  - Manage/alleviate anxiety  - Utilize distraction and/or relaxation techniques  - Monitor for opioid side effects  - Notify MD/LIP if interventions unsuccessful or patient reports new pain  - Anticipate increased pain with activity and pre-medicate as appropriate  Outcome: Progressing     Problem: SAFETY ADULT - FALL  Goal: Free from fall injury  Description: INTERVENTIONS:  - Assess pt frequently for physical needs  - Identify cognitive and physical deficits and behaviors that affect risk of falls.  - [place] fall precautions as indicated by assessment.  - Educate pt/family on patient safety including physical limitations  - Instruct pt to call for assistance with activity based on assessment  - Modify environment to reduce risk of injury  - Provide assistive devices as appropriate  - Consider OT/PT consult to assist with strengthening/mobility  - Encourage toileting schedule  Outcome: Progressing

## 2024-04-08 NOTE — PHYSICAL THERAPY NOTE
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    Patient continues to function below baseline with bed mobility, transfers, gait, and standing prolonged periods.  Contributing factors to remaining limitations include decreased functional strength, decreased endurance/aerobic capacity, pain, decreased muscular endurance, and L LE TTWB .  Next session anticipate patient to progress bed mobility, transfers, gait, and maintaining seated position.  Physical Therapy will continue to follow patient for duration of hospitalization.    Patient continues to benefit from continued skilled PT services: to promote return to prior level of function and safety with continuous assistance and gradual rehabilitative therapy .    PLAN  PT Treatment Plan: Bed mobility;Body mechanics;Coordination;Endurance;Patient education;Gait training;Strengthening;Transfer training;Balance training  Frequency (Obs): Daily    SUBJECTIVE  Pt was agreeable to therapy session.     OBJECTIVE  Precautions: Bed/chair alarm    WEIGHT BEARING RESTRICTION           L Lower Extremity: Toe Touch Weight Bearing    PAIN ASSESSMENT   Rating:  (did  not rate)  Location: L hip  Management Techniques: Activity promotion;Body mechanics;Relaxation;Repositioning    BALANCE  Static Sitting: Good  Dynamic Sitting: Fair +  Static Standing: Fair -  Dynamic Standing: Not tested  ACTIVITY TOLERANCE           BP: 128/81  BP Location: Right arm  BP Method: Automatic  Patient  Position: Sitting    O2 WALK       AM-PAC '6-Clicks' INPATIENT SHORT FORM - BASIC MOBILITY  How much difficulty does the patient currently have...  Patient Difficulty: Turning over in bed (including adjusting bedclothes, sheets and blankets)?: A Lot   Patient Difficulty: Sitting down on and standing up from a chair with arms (e.g., wheelchair, bedside commode, etc.): A Lot   Patient Difficulty: Moving from lying on back to sitting on the side of the bed?: A Lot   How much help from another person does the patient currently need...   Help from Another: Moving to and from a bed to a chair (including a wheelchair)?: A Little   Help from Another: Need to walk in hospital room?: A Lot   Help from Another: Climbing 3-5 steps with a railing?: Total     AM-PAC Score:  Raw Score: 12   Approx Degree of Impairment: 68.66%   Standardized Score (AM-PAC Scale): 35.33   CMS Modifier (G-Code): CL    FUNCTIONAL ABILITY STATUS  Functional Mobility/Gait Assessment  Gait Assistance: Minimum assistance  Distance (ft): stood for about 1-2 minutes  Assistive Device: Rolling walker  Pattern: L Decreased stance time (L LE TTWB)  Rolling:  NT  Supine to Sit:  NT  Sit to Supine:  NT  Sit to Stand: moderate assist    Additional Information: Pt is received in the chair with her spouse present and ws cleared for therapy session. Pt reported that she was having some dizziness but BP was WFL. Pt is mod A with sit<>stand transfers with the RW. Pt was able to stand and work on standing balance for about 1-2 minutes and min A with standing balance. Pt was to fatigued to attempt hopping at this time. Pt was able to maintain her TTWB status while standing. Returned pt back to the chair with all needs within reach. Reported to the RN on the status of the pt.     The patient's Approx Degree of Impairment: 68.66% has been calculated based on documentation in the [facility] '6 clicks' Inpatient Basic Mobility Short Form.  Research supports that patients with  this level of impairment may benefit from Rehab.  Final disposition will be made by interdisciplinary medical team.      Patient End of Session: Up in chair;Needs met;Call light within reach;RN aware of session/findings;All patient questions and concerns addressed;Family present    CURRENT GOALS   Goals to be met by: 4/18/24  Patient Goal Patient's self-stated goal is: go home   Goal #1 Patient is able to demonstrate supine - sit EOB @ level: CGA   Goal #1   Current Status NT received in the chair   Goal #2 Patient is able to demonstrate transfers Sit to/from Stand at assistance level: SBA      Goal #2  Current Status  mod A with the RW   Goal #3 Patient is able to ambulate 75 feet with assistive device at assistance level: SBA while maintaining LLE TTWB status   Goal #3   Current Status  stood 1-2 minutes with the RW min A TTWB maintained    Goal #4 Pending progress should patient return home   Patient will negotiate 6 stairs/one curb w/ assistive device and Min A   Goal #4   Current Status  NT   Goal #5 Patient verbalizes and/or demonstrates all precautions and safety concerns independently   Goal #5   Current Status  Ongoing   Goal #6 Patient independently performs home exercise program for ROM/strengthening per the instructions provided in preparation for discharge.   Goal #6  Current Status  Ongoing       Therapeutic Activity: 25 minutes

## 2024-04-08 NOTE — CM/SW NOTE
VERONICA followed up on DC planning.     SW spoke with pt and spouse again regarding SNF    However they are still wanting a facility closer to their home    SW called [facility] in [facility] however admissions stating they need time to review clinicals    UPDATE:     VERONICA finally received calls from several facilities closer to pt's address.     [facility] and [facility] - both can accept    Upon providing new list of SNF with additional facilities pt and spouse still hoping for [facility]     VERONICA called and followed up with [facility] however admissions stating their director is actually out today and would not give an answer until tomorrow    Pt and spouse hoping to hear about acceptance tomorrow, Otherwise SW advised a back up choice is needed as pt should be cleared for DC - both verbalized understanding    [facility] - [phone]    PLAN: SNF - pending if [facility] can accept/ back up SNF    Cayla Orosco, LSW, MSW ext. [phone]

## 2024-04-09 VITALS
HEART RATE: 77 BPM | DIASTOLIC BLOOD PRESSURE: 64 MMHG | WEIGHT: 213.19 LBS | BODY MASS INDEX: 35.09 KG/M2 | HEIGHT: 65.5 IN | TEMPERATURE: 98 F | SYSTOLIC BLOOD PRESSURE: 124 MMHG | OXYGEN SATURATION: 97 % | RESPIRATION RATE: 18 BRPM

## 2024-04-09 NOTE — PLAN OF CARE
Pt is A&Ox4. Clear for dc to rehab via [facility].     Problem: Patient Centered Care  Goal: Patient preferences are identified and integrated in the patient's plan of care  Description: Interventions:  - What would you like us to know as we care for you? This is my 5th hip surgery  - Provide timely, complete, and accurate information to patient/family  - Incorporate patient and family knowledge, values, beliefs, and cultural backgrounds into the planning and delivery of care  - Encourage patient/family to participate in care and decision-making at the level they choose  - Honor patient and family perspectives and choices  Outcome: Adequate for Discharge     Problem: Patient/Family Goals  Goal: Patient/Family Long Term Goal  Description: Patient's Long Term Goal: To be discharged    Interventions:  - get clearance   - See additional Care Plan goals for specific interventions  Outcome: Adequate for Discharge  Goal: Patient/Family Short Term Goal  Description: Patient's Short Term Goal: Manage pain    Interventions:   - monitor and assess pain   - See additional Care Plan goals for specific interventions  Outcome: Adequate for Discharge     Problem: PAIN - ADULT  Goal: Verbalizes/displays adequate comfort level or patient's stated pain goal  Description: INTERVENTIONS:  - Encourage pt to monitor pain and request assistance  - Assess pain using appropriate pain scale  - Administer analgesics based on type and severity of pain and evaluate response  - Implement non-pharmacological measures as appropriate and evaluate response  - Consider cultural and social influences on pain and pain management  - Manage/alleviate anxiety  - Utilize distraction and/or relaxation techniques  - Monitor for opioid side effects  - Notify MD/LIP if interventions unsuccessful or patient reports new pain  - Anticipate increased pain with activity and pre-medicate as appropriate  Outcome: Adequate for Discharge     Problem: RISK FOR INFECTION -  ADULT  Goal: Absence of fever/infection during anticipated neutropenic period  Description: INTERVENTIONS  - Monitor WBC  - Administer growth factors as ordered  - Implement neutropenic guidelines  Outcome: Adequate for Discharge     Problem: SAFETY ADULT - FALL  Goal: Free from fall injury  Description: INTERVENTIONS:  - Assess pt frequently for physical needs  - Identify cognitive and physical deficits and behaviors that affect risk of falls.  - [place] fall precautions as indicated by assessment.  - Educate pt/family on patient safety including physical limitations  - Instruct pt to call for assistance with activity based on assessment  - Modify environment to reduce risk of injury  - Provide assistive devices as appropriate  - Consider OT/PT consult to assist with strengthening/mobility  - Encourage toileting schedule  Outcome: Adequate for Discharge     Problem: DISCHARGE PLANNING  Goal: Discharge to home or other facility with appropriate resources  Description: INTERVENTIONS:  - Identify barriers to discharge w/pt and caregiver  - Include patient/family/discharge partner in discharge planning  - Arrange for needed discharge resources and transportation as appropriate  - Identify discharge learning needs (meds, wound care, etc)  - Arrange for interpreters to assist at discharge as needed  - Consider post-discharge preferences of patient/family/discharge partner  - Complete POLST form as appropriate  - Assess patient's ability to be responsible for managing their own health  - Refer to Case Management Department for coordinating discharge planning if the patient needs post-hospital services based on physician/LIP order or complex needs related to functional status, cognitive ability or social support system  Outcome: Adequate for Discharge

## 2024-04-09 NOTE — CM/SW NOTE
04/09/24 0939   Discharge disposition   Expected discharge disposition subacute   Post Acute Care Provider SNF Other  ([facility])   Discharge transportation [facility]     Patient discussed during nursing rounds. Patient medically cleared for discharge per RN.     Per patient and spouse, choice for [facility] is [facility]. Per Renu with facility, they are able to accept patient today.     Patient's RN confirmed patient needs [facility] for transport. Per Carlyn with [facility]- they are able to transport patient to facility with quoted cost of $450 to be paid upfront (due to long distance). Discussed with patient's spouse- he called and provided payment over the phone to [facility].    [facility] arranged for 2p . Renu with facility approves this time.    Patient's RN updated. Patient and spouse updated and agreeable on discharge plan.     RN Report #:  [id number]  [facility]: [phone]    Plan: [facility] for [facility] at 2p via [facility] Ye[facility], RN, BSN

## 2024-04-09 NOTE — CM/SW NOTE
CM followed up with patient and spouse, choice for [facility] is [facility]. Facility notified and waiting for response of if bed is available today.    Plan: [facility] for [facility]     Cayla Yepez RN, BSN

## 2024-04-09 NOTE — PLAN OF CARE
Patient Alert and Oriented x4. Toe Touch weight bearing, Rolling chair to bathroom. Prevena wound vac. Call light and personal belongings within arms reach. Plan for SNF  Problem: Patient Centered Care  Goal: Patient preferences are identified and integrated in the patient's plan of care  Description: Interventions:  - What would you like us to know as we care for you? This is my 5th hip surgery  - Provide timely, complete, and accurate information to patient/family  - Incorporate patient and family knowledge, values, beliefs, and cultural backgrounds into the planning and delivery of care  - Encourage patient/family to participate in care and decision-making at the level they choose  - Honor patient and family perspectives and choices  Outcome: Progressing     Problem: PAIN - ADULT  Goal: Verbalizes/displays adequate comfort level or patient's stated pain goal  Description: INTERVENTIONS:  - Encourage pt to monitor pain and request assistance  - Assess pain using appropriate pain scale  - Administer analgesics based on type and severity of pain and evaluate response  - Implement non-pharmacological measures as appropriate and evaluate response  - Consider cultural and social influences on pain and pain management  - Manage/alleviate anxiety  - Utilize distraction and/or relaxation techniques  - Monitor for opioid side effects  - Notify MD/LIP if interventions unsuccessful or patient reports new pain  - Anticipate increased pain with activity and pre-medicate as appropriate  Outcome: Progressing

## 2024-04-09 NOTE — PROGRESS NOTES
[facility]  part of [facility]    Progress Note    Silvana Owusu Patient Status:  Inpatient    11/3/1955 MRN [id number]   Location [facility] 4W/SW/SE Attending Pepe Aguero MD   Hosp Day # 6 PCP No primary care provider on file.       Subjective:   Silvana Owusu is a(n) 68 year old female is doing well today with a runny nose.     Objective:   Vital Signs:  Blood pressure 124/64, pulse 77, temperature 97.6 °F (36.4 °C), temperature source Temporal, resp. rate 18, height 5' 5.5\" (1.664 m), weight 213 lb 3.2 oz (96.7 kg), SpO2 97%.     General: No acute distress. Alert and oriented x 3.  HEENT: Moist mucous membranes.   Neck: No lymphadenopathy.  No JVD. No carotid bruits.  Respiratory: Clear to auscultation bilaterally.  No wheezes. No rhonchi.  Cardiovascular: S1, S2.  Regular rate and rhythm.  No murmurs. Equal pulses   Abdomen: Soft, nontender, nondistended.  Positive bowel sounds. No rebound tenderness  Neurologic: No focal neurological deficits.  Musculoskeletal: Full range of motion of all extremities.  No swelling noted.  Integument: No lesions. No erythema.  Psychiatric: Appropriate mood and affect.      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)  S/P left hip revision       Obesity (BMI 30.0-34.9)        History of CVA (cerebrovascular accident)        Primary hypertension        High cholesterol        ARISTEO on CPAP        Prediabetes        S/P revision of total hip    Plan for rehabilitation         Results:     Lab Results   Component Value Date    WBC 11.4 (H) 2024    HGB 11.9 (L) 2024    HCT 37.8 2024    .0 2024    CREATSERUM 0.70 2024    BUN 13 2024     2024    K 4.8 2024     2024    CO2 28.0 2024     (H) 2024    CA 8.7 2024    ALB 3.4 2024    ALKPHO 68 2024    BILT 0.5 2024    TP 6.2 2024    AST 56 (H)  04/04/2024    ALT 48 04/04/2024    ESRML 29 03/20/2024    CRP 1.70 (H) 03/20/2024       No results found.                Tao Mojica DO  4/9/2024

## (undated) DEVICE — GAMMEX® PI HYBRID SIZE 7.5, STERILE POWDER-FREE SURGICAL GLOVE, POLYISOPRENE AND NEOPRENE BLEND: Brand: GAMMEX

## (undated) DEVICE — GAMMEX® PI HYBRID SIZE 6.5, STERILE POWDER-FREE SURGICAL GLOVE, POLYISOPRENE AND NEOPRENE BLEND: Brand: GAMMEX

## (undated) DEVICE — KIT NEG PRSS PREVENA DRAIN 20CM INCIS MGMT PEEL PALACE

## (undated) DEVICE — SPONGE GZ 4XL4IN 100% COT 12 PLY TYP VII WVN

## (undated) DEVICE — CONTAINER,SPECIMEN,OR STERILE,4OZ: Brand: MEDLINE

## (undated) DEVICE — 3M™ IOBAN™ 2 ANTIMICROBIAL INCISE DRAPE 6650EZ: Brand: IOBAN™ 2

## (undated) DEVICE — PACK CDS TOTAL HIP

## (undated) DEVICE — Device

## (undated) DEVICE — 12 ML SYRINGE LUER-LOCK TIP: Brand: MONOJECT

## (undated) DEVICE — 1010 S-DRAPE TOWEL DRAPE 10/BX: Brand: STERI-DRAPE™

## (undated) DEVICE — GAMMEX® PI HYBRID SIZE 8, STERILE POWDER-FREE SURGICAL GLOVE, POLYISOPRENE AND NEOPRENE BLEND: Brand: GAMMEX

## (undated) DEVICE — 2T11 #2 PDO 36 X 36: Brand: 2T11 #2 PDO 36 X 36

## (undated) DEVICE — DRAPE ORTH 60IN X 70IN POLY FLM ANCIL U RSST

## (undated) DEVICE — HOOD: Brand: FLYTE

## (undated) DEVICE — APPLICATOR PREP 26ML CHG 2% ISO ALC 70%

## (undated) DEVICE — ANTIBACTERIAL VIOLET BRAIDED (POLYGLACTIN 910), SYNTHETIC ABSORBABLE SUTURE: Brand: COATED VICRYL

## (undated) DEVICE — NEEDLE SPNL 18GA L3.5IN W/ QNCKE SHARPER BVL

## (undated) DEVICE — OR TOWEL, 17" X 26" STERILE, BLUE: Brand: PREMIERPRO

## (undated) DEVICE — INTENDED USE FOR SURGICAL MARKING ON INTACT SKIN, ALSO PROVIDES A PERMANENT METHOD OF IDENTIFYING OBJECTS IN THE OPERATING ROOM: Brand: WRITESITE® PLUS MINI PREP RESISTANT MARKER

## (undated) DEVICE — SOLUTION IRRIG 1000ML 0.9% NACL USP BTL

## (undated) DEVICE — PROXIMATE SKIN STAPLERS (35 WIDE) CONTAINS 35 STAINLESS STEEL STAPLES (FIXED HEAD): Brand: PROXIMATE

## (undated) DEVICE — SUT VCRL 2-0 36IN CT-1 ABSRB UD L36MM 1/2 CIR

## (undated) DEVICE — COVER BK TBL L72IN BLU PD HVY DTY BK TBL CVR

## (undated) DEVICE — SOLUTION IRRIG 3000ML 0.9% NACL FLX CONT

## (undated) DEVICE — SHEET,DRAPE,53X77,STERILE: Brand: MEDLINE

## (undated) DEVICE — PILLOW ABD M W15XH6XL22IN LEG RASPBERRY FOAM

## (undated) DEVICE — 35 ML SYRINGE LUER-LOCK TIP: Brand: MONOJECT

## (undated) NOTE — IP AVS SNAPSHOT
Patient Demographics     Address  [address] [place] [id number] Phone  [phone] (Home) *Preferred*  [phone] (Mobile)      Patient Contacts     Name Relation Home Work Mobile    Masood Owusu Spouse   [phone]      Allergies as of 4/9/2024  Review status set to In Progress on 4/6/2024       Noted Reaction Type Reactions    Morphine 07/23/2013    HYPOTENSION, OTHER (SEE COMMENTS)    Blood pressure dropped very low, pt was unable to sit up, stand up until morphine was discontinued  \"low blood pressure\" denies hives    Naproxen 01/13/2010   Systemic OTHER (SEE COMMENTS), SWELLING    Hands swell, pt sts she is able to take celebrex  Hand swelling.  Swelling in hands  Hand swelling    Radiology Contrast Iodinated Dyes 10/30/2023    HIVES, ITCHING      Code Status Information     Code Status    Full Code        Patient Instructions                 Frequently Asked Questions after  Total Hip/Knee Arthroplasty  Dr. Pepe Aguero- Surgeon  Conchis Whittington- Physician Assistant  Bianka Mccartney- Physician Assistant  Ira Gomez- Registered Orthopedic Nurse      When should I follow up with Dr. Aguero’s team?  You should follow up with Dr. Aguero/ his Physician Assistants approximately 2-3 weeks and 6 weeks after your surgery.  These appointments should have been made at the time you scheduled your surgery, and the dates/times should be in your orange [facility] information booklet.  If you aren’t sure about this date, please call our office [phone].    Is swelling normal?  YES! We anticipate swelling, but this can be managed well with ice and elevation.  Please ice/elevate 4 times per day for 30 minutes at a minimum. When elevating please make sure the surgical leg is higher than the heart. A good way to do this is to lay completely flat and put the leg on an arm rest of your couch with a few pillows under the ankle. Please make sure the leg is straight when elevating.   If you are short of breath or have  calf pain please call us or go to the ER before elevating the leg.    How should I take care of my incision and dressing?  The Aquacel Ag dressing must be removed after 7 days unless saturated before then. Please review instructions on proper removal of the dressing.  If dressing becomes saturated before the 7 day rio, please remove the Aquacel Ag dressing sooner.  Non-stapled incisions: the incision may be left open to air if there is no drainage after the Aquacel Ag is removed. If there is drainage present, please keep the incision covered with and ABD pad changed daily.  Stapled incisions: once the Aquacel Ag dressing is removed, replaced with an ABD pad. Change daily.    How long am I going to be on a blood thinner?  You will be on a blood thinner (aspirin, warfarin, rivaroxaban) for one month from your surgical date to prevent blood clots.  If you were taking a blood thinner prior to surgery, you will continue this per the recommendation of the prescribing doctor.    How often should I go to physical therapy after surgery?  If you are going directly to outpatient physical therapy:  Knee replacement- 5x/ week the 1st week, 3x/ week thereafter.  Hip replacement- 3x/ week  If you have home health care, this will be for a total of two weeks  Knee replacement- 5x the 1st week, 3x the 2nd week.  Hip replacement- 3x/ week for 2 weeks.    How long should I be taking my medications?  Continue to take your blood thinner (ie aspirin, coumadin/ warfarin, lovenox) and pantoprazole for 1 month from surgery.  If you were prescribed an anti-inflammatory medication (ibuprofen, meloxicam, naproxen etc.), please continue to take this while on your blood thinner. Your pharmacist may instruct you differently. You will be on this for at least 6 weeks.  Continue your stool softener (colace) as long as your are taking narcotic pain medication.    What are the signs of infection I should look for?  It is common for the knee and hip to  be red and warm after surgery  Our suspicion for infection rises with any of the followin.  There is pus like discharge from the incision  2.  Your temperature is over 101 degrees  3.  It is painful to move the leg through a gentle range of motion  IF ANY OF THESE THREE OCCUR PLEASE CONTACT OUR OFFICE IMMEDIATELY or if it is after hours, please dial “zero” to talk to the physician on call when you hear the voicemail.      When can I drive?  You can drive when you are off all narcotic pain medications (including Norco, Dilaudid, Oxycontin, oyxcodone).  You must also feel that you are capable of driving safely; meaning you can push on the gas and brake with force if needed.    When can I shower?  The Aquacel Ag dressing is “waterproof”. You may shower with it in place. After the Aquacel Ag dressing has been removed, the incision my get wet in the shower (if you do NOT have staples). Pat dry with a towel when done.  If you have staples, please use Saran Wrap over the surgical wound when showering. It should NOT get wet while staples are in place.  When can I go in a hot tub/bath/pool?  6 weeks from your surgery if you incision has COMPLETELY healed (no scabs or open areas)      How do I get a refill on my pain medications if necessary before my next appointment?  Oxycodone and Norco (hydrocodone- acetaminophen) require an electronic or physical prescription. Please phone the office at [phone] to arrange for  of a hard copy or to have one mailed to you at least 3-5 days before you will run out of medication. Many large chain pharmacies will accept an electronic prescription.  Please understand that the requests will be taken care of as soon as possible but if it is the weekend they may not be refilled until Monday. Please allow 24- 48 hours on refill requests.    How do I get a handicap placard?  We are happy to provide you with ONE handicap placard at the time of your surgery which will be good for  six months.  Please ask our office at your pre-op or post-op appointment if you feel you will need a placard.  If you feel you need a handicap placard for longer than 6 months from surgery please contact your primary care physician.    If you have any questions related to medical issues unrelated to your knee or hip, please contact the physician that cleared you for surgery:  Dr. Mojica or Dr. Delarosa: [phone]     If you have questions pertaining to the surgery, please feel free to contact our office:   Phone # [phone]  Fax # [phone]    If you have arranged for outpatient physical therapy upon your discharge from the hospital, you will need to remove the Aquacel surgical dressing on post operative day 7. You should have received instructions on how to properly do this before you left the hospital.     Ice/elevate:  Please ice/elevate 4 times per day for 30 minutes at a minimum.  When elevating please make sure the surgical leg is higher than the heart.  A good way to do this is to lay completely flat and put the leg on an arm rest of your couch with a few pillows under the ankle.  Please make sure the leg is straight when elevating.  If you are short of breath or have calf pain please call us or go to the ER before elevating the leg.      Orthopaedic Discharge Bowel Program while on Pain Medications (Opiods)    Docusate sodium (Colace) 100 mg after breakfast and at bedtime.  Polyethylene glycol (Glycolax or Miralax)17 Gm Daily.  Mix well in glass of water.  Hold both docusate sodium (Colace) and polyethylene glycol (Miralax) if greater than three stools per day.  If no stool after two days at home take senna (Senakot) 1-2 tablets at bedtime.  Repeat nightly untill stool occurs.      Continue program as long as continuing opioids.  Dressing change instructions:  The Aquacel dressing is to be removed on post-op day 7, unless the dressing is not adhering properly--in which case the dressing is to be  changed sooner.  To remove the dressing, see instructions below.        Aquacel Removal  Press down on the skin with one hand and carefully lift an edge of the dressing with your other hand, then stretch the dressing to break the adhesive seal.  Stretching releases the adhesive and will allow the dressing to come off cleanly and easily, without damage to the skin.  Wound Care:  Non-stapled incisions: once the Aquacel dressing is removed, leave the incision open to air. If there is drainage, cover with an ABD pad. Change daily.  Stapled incisions: once the Aquacel dressing is removed, keep covered with an ABD pad. Change daily.  Do NOT cleanse the incision  Do NOT apply any ointments/creams/lotions for at least the first 6 weeks after surgery     This includes, but is not limited to, triple-antibiotic/Neosporin/Vitamin E/etc.    Shower instructions:  The Aquacel Ag dressing is “waterproof.”  You may shower with it in place.    Non-stapled (glued) incisions: the incision may get wet in the shower after the Aquacel Ag has been removed. If you are experiencing incisional drainage, please wrap the your surgical wound in Saran Wrap when showering.  Stapled incisions: the incision may NOT get wet in the shower after the Aquacel Ag has been removed. Please wrap the your surgical wound in Saran Wrap when showering.    Call your surgeon’s office if:  The dressing will not stay in place  If there is any skin issue such as tearing or blistering  More than 50% of the dressing becomes saturated  There is a large amount of fluid coming from the incision  You experience unusual pain or odor     Follow-up Information     Pepe Aguero MD Follow up in 2 week(s).    Specialty: SURGERY, ORTHOPEDIC  Contact information:  [address] [place] [id number] [phone]                        Your Home Meds List      TAKE these medications       Instructions Authorizing Provider Morning Afternoon Evening As Needed   acetaminophen 325 MG  Tabs  Commonly known as: Tylenol  Next dose due: tonight      Take 2 tablets (650 mg total) by mouth every 6 (six) hours.   Radha Guerrero         aspirin 81 MG Tbec  Next dose due: tonight      Take 1 tablet (81 mg total) by mouth in the morning and 1 tablet (81 mg total) before bedtime.   Radha Guerrero         docusate sodium 100 MG Caps  Commonly known as: COLACE  Next dose due: tonight      Take 100 mg by mouth 2 (two) times daily.   Radha Guerrero         famotidine 40 MG Tabs  Commonly known as: Pepcid  Next dose due: tomorrow      Take 1 tablet (40 mg total) by mouth daily.          famotidine 20 MG Tabs  Commonly known as: Pepcid  Next dose due: tonight      Take 1 tablet (20 mg total) by mouth 2 (two) times daily.   Radha Guerrero         lisinopril 5 MG Tabs  Commonly known as: Prinivil; Zestril  Next dose due: tonight      Take 1 tablet (5 mg total) by mouth daily.          traMADol 50 MG Tabs  Commonly known as: Ultram      Take 1-2 tablets ( mg total) by mouth every 6 (six) hours as needed.   Radha Guerrero                  [id number] - MAR ACTION REPORT  (last 48 hrs)    ** SITE UNKNOWN **     Order ID Medication Name Action Time Action Reason Comments    [id number] acetaminophen (Tylenol) tab 650 mg 04/07/24 1317 Given      [id number] acetaminophen (Tylenol) tab 650 mg 04/07/24 1717 Given      [id number] acetaminophen (Tylenol) tab 650 mg 04/08/24 0031 Given      [id number] acetaminophen (Tylenol) tab 650 mg 04/08/24 0623 Given      [id number] acetaminophen (Tylenol) tab 650 mg 04/08/24 1131 Given      [id number] acetaminophen (Tylenol) tab 650 mg 04/08/24 1716 Given      [id number] acetaminophen (Tylenol) tab 650 mg 04/09/24 0648 Given      [id number] aspirin DR tab 81 mg 04/07/24 2110 Given      [id number] aspirin DR tab 81 mg 04/08/24 0902 Given      [id number] aspirin DR tab 81 mg 04/08/24 2104 Given      [id number] aspirin DR tab 81 mg 04/09/24 0836 Given      [id number]  docusate sodium (Colace) cap 100 mg 04/07/24 2110 Given      [id number] docusate sodium (Colace) cap 100 mg 04/08/24 0902 Given      [id number] docusate sodium (Colace) cap 100 mg 04/08/24 2105 Given      [id number] docusate sodium (Colace) cap 100 mg 04/09/24 0836 Given      [id number] famotidine (Pepcid) tab 20 mg (Or Linked Group #1) 04/07/24 2110 Given      [id number] famotidine (Pepcid) tab 20 mg 04/08/24 0902 Given      [id number] famotidine (Pepcid) tab 20 mg 04/08/24 2104 Given      [id number] famotidine (Pepcid) tab 20 mg 04/09/24 0836 Given      [id number] lisinopril (Prinivil; Zestril) tab 5 mg 04/08/24 0902 Given      [id number] lisinopril (Prinivil; Zestril) tab 5 mg 04/09/24 0836 Given      [id number] sennosides (Senokot) tab 17.2 mg 04/07/24 2110 Given      [id number] sennosides (Senokot) tab 17.2 mg 04/08/24 2104 Given              Recent Vital Signs    Flowsheet Row Most Recent Value   /64 Filed at 04/09/2024 0755   Pulse 77 Filed at 04/08/2024 1707   Resp 18 Filed at 04/09/2024 0755   Temp 97.6 °F (36.4 °C) Filed at 04/08/2024 2012   SpO2 97 % Filed at 04/09/2024 0755      Patient's Most Recent Weight    Flowsheet Row Most Recent Value   Patient Weight 96.7 kg (213 lb 3.2 oz)      Lab Results Last 24 Hours    No matching results found     Microbiology Results (All)     Procedure Component Value Units Date/Time    Anaerobic Culture [[id number]] Collected: 04/03/24 1523    Order Status: Completed Lab Status: Final result Updated: 04/08/24 1130    Specimen: Tissue from Hip, left      Anaerobic Culture No Anaerobes isolated    Tissue Aerobic Culture [[id number]] Collected: 04/03/24 1523    Order Status: Completed Lab Status: Final result Updated: 04/06/24 0639    Specimen: Tissue from Hip, left      Tissue Culture Result No Growth 3 Days     Tissue Smear No WBCs seen      No organisms seen      H&P Note    No notes of this type exist for this encounter.     D/C Summary    No notes of this type exist for this  encounter.        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    Patient continues to function below baseline with bed mobility, transfers, gait, and standing prolonged periods.  Contributing factors to remaining limitations include decreased functional strength, decreased endurance/aerobic capacity, pain, decreased muscular endurance, and L LE TTWB .  Next session anticipate patient to progress bed mobility, transfers, gait, and maintaining seated position.  Physical Therapy will continue to follow patient for duration of hospitalization.    Patient continues to benefit from continued skilled PT services: to promote return to prior level of function and safety with continuous assistance and gradual rehabilitative therapy .    PLAN  PT Treatment Plan: Bed mobility;Body mechanics;Coordination;Endurance;Patient education;Gait training;Strengthening;Transfer training;Balance training  Frequency (Obs): Daily    SUBJECTIVE  Pt was agreeable to therapy session.     OBJECTIVE  Precautions: Bed/chair alarm    WEIGHT BEARING RESTRICTION           L Lower  Extremity: Toe Touch Weight Bearing    PAIN ASSESSMENT   Rating:  (did  not rate)  Location: L hip  Management Techniques: Activity promotion;Body mechanics;Relaxation;Repositioning    BALANCE  Static Sitting: Good  Dynamic Sitting: Fair +  Static Standing: Fair -  Dynamic Standing: Not tested  ACTIVITY TOLERANCE           BP: 128/81  BP Location: Right arm  BP Method: Automatic  Patient Position: Sitting    O2 WALK       AM-PAC '6-Clicks' INPATIENT SHORT FORM - BASIC MOBILITY  How much difficulty does the patient currently have...  Patient Difficulty: Turning over in bed (including adjusting bedclothes, sheets and blankets)?: A Lot   Patient Difficulty: Sitting down on and standing up from a chair with arms (e.g., wheelchair, bedside commode, etc.): A Lot   Patient Difficulty: Moving from lying on back to sitting on the side of the bed?: A Lot   How much help from another person does the patient currently need...   Help from Another: Moving to and from a bed to a chair (including a wheelchair)?: A Little   Help from Another: Need to walk in hospital room?: A Lot   Help from Another: Climbing 3-5 steps with a railing?: Total     AM-PAC Score:  Raw Score: 12   Approx Degree of Impairment: 68.66%   Standardized Score (AM-PAC Scale): 35.33   CMS Modifier (G-Code): CL    FUNCTIONAL ABILITY STATUS  Functional Mobility/Gait Assessment  Gait Assistance: Minimum assistance  Distance (ft): stood for about 1-2 minutes  Assistive Device: Rolling walker  Pattern: L Decreased stance time (L LE TTWB)  Rolling:  NT  Supine to Sit:  NT  Sit to Supine:  NT  Sit to Stand: moderate assist    Additional Information: Pt is received in the chair with her spouse present and ws cleared for therapy session. Pt reported that she was having some dizziness but BP was WFL. Pt is mod A with sit<>stand transfers with the RW. Pt was able to stand and work on standing balance for about 1-2 minutes and min A with standing balance. Pt was to fatigued  to attempt hopping at this time. Pt was able to maintain her TTWB status while standing. Returned pt back to the chair with all needs within reach. Reported to the RN on the status of the pt.     The patient's Approx Degree of Impairment: 68.66% has been calculated based on documentation in the [facility] '6 clicks' Inpatient Basic Mobility Short Form.  Research supports that patients with this level of impairment may benefit from Rehab.  Final disposition will be made by interdisciplinary medical team.      Patient End of Session: Up in chair;Needs met;Call light within reach;RN aware of session/findings;All patient questions and concerns addressed;Family present    CURRENT GOALS   Goals to be met by: 4/18/24  Patient Goal Patient's self-stated goal is: go home   Goal #1 Patient is able to demonstrate supine - sit EOB @ level: CGA   Goal #1   Current Status NT received in the chair   Goal #2 Patient is able to demonstrate transfers Sit to/from Stand at assistance level: SBA      Goal #2  Current Status  mod A with the RW   Goal #3 Patient is able to ambulate 75 feet with assistive device at assistance level: SBA while maintaining LLE TTWB status   Goal #3   Current Status  stood 1-2 minutes with the RW min A TTWB maintained    Goal #4 Pending progress should patient return home   Patient will negotiate 6 stairs/one curb w/ assistive device and Min A   Goal #4   Current Status  NT   Goal #5 Patient verbalizes and/or demonstrates all precautions and safety concerns independently   Goal #5   Current Status  Ongoing   Goal #6 Patient independently performs home exercise program for ROM/strengthening per the instructions provided in preparation for discharge.   Goal #6  Current Status  Ongoing       Therapeutic Activity: 25 minutes       Physical Therapy Note signed by Gary Mondragon PTA at 4/7/2024  2:33 PM  Version 1 of 1    Author: Gary Mondragon PTA Service: Rehab Author Type: Physical Therapy Assistant    Filed:  4/7/2024  2:33 PM Date of Service: 4/7/2024  2:26 PM Status: Signed    : Gary Mondragon PTA (Physical Therapy Assistant)       PHYSICAL THERAPY HIP TREATMENT NOTE - INPATIENT    Room Number: 407/407-A            Presenting Problem: failure of left total hip arthroplasty, s/p  L hip revision       Problem List  Principal Problem:    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    Patient continues to function below baseline with bed mobility, transfers, gait, and standing prolonged periods.  Contributing factors to remaining limitations include decreased functional strength, decreased endurance/aerobic capacity, pain, decreased muscular endurance, and WB status  .  Next session anticipate patient to progress bed mobility, transfers, gait, and standing prolonged periods.  Physical Therapy will continue to follow patient for duration of hospitalization.    Patient continues to benefit from continued skilled PT services: to promote return to prior level of function and safety with continuous assistance and gradual rehabilitative therapy .    PLAN  PT Treatment Plan: Bed mobility;Body mechanics;Coordination;Endurance;Patient education;Gait training;Strengthening;Transfer training;Balance training  Frequency (Obs): Daily    SUBJECTIVE  Pt was agreeable to therapy session.         OBJECTIVE  Precautions: Bed/chair alarm    WEIGHT BEARING RESTRICTION           L Lower Extremity: Toe Touch Weight Bearing    PAIN ASSESSMENT   Rating:  (did not rate)  Location: L hip  Management Techniques: Activity promotion;Body mechanics;Relaxation;Repositioning    BALANCE  Static Sitting: Fair +  Dynamic Sitting: Fair  Static  Standing: Poor +  Dynamic Standing: Poor +  ACTIVITY TOLERANCE                         O2 WALK       AM-PAC '6-Clicks' INPATIENT SHORT FORM - BASIC MOBILITY  How much difficulty does the patient currently have...  Patient Difficulty: Turning over in bed (including adjusting bedclothes, sheets and blankets)?: A Lot   Patient Difficulty: Sitting down on and standing up from a chair with arms (e.g., wheelchair, bedside commode, etc.): A Little   Patient Difficulty: Moving from lying on back to sitting on the side of the bed?: A Lot   How much help from another person does the patient currently need...   Help from Another: Moving to and from a bed to a chair (including a wheelchair)?: A Little   Help from Another: Need to walk in hospital room?: A Lot   Help from Another: Climbing 3-5 steps with a railing?: Total     AM-PAC Score:  Raw Score: 13   Approx Degree of Impairment: 64.91%   Standardized Score (AM-PAC Scale): 36.74   CMS Modifier (G-Code): CL    FUNCTIONAL ABILITY STATUS  Functional Mobility/Gait Assessment  Gait Assistance: Minimum assistance  Distance (ft): SPT  Assistive Device: Rolling walker  Pattern: L Decreased stance time (L LE TTWB)  Rolling:  NT  Supine to Sit:  NT  Sit to Supine:  NT  Sit to Stand: minimal assist    Additional Information: Pt was received in the commode chair with her  present and was cleared for therapy session. Reviewed pt TTWB status and with good understanding. Pt is min A with sit<>stand transfers with the RW. Pt is cued for proper technique and sequencing. Pt was able to perform a SPT from the commode chair to the bs chair with the RW min A for balance and safety. Pt was repositioned and left in the chair with all needs within reach. Reported to the RN on the status of the pt.     The patient's Approx Degree of Impairment: 64.91% has been calculated based on documentation in the [facility] '6 clicks' Inpatient Basic Mobility Short Form.  Research supports that patients with  this level of impairment may benefit from Rehab.  Final disposition will be made by interdisciplinary medical team.      Patient End of Session: Up in chair;Needs met;Call light within reach;RN aware of session/findings;All patient questions and concerns addressed;Family present    CURRENT GOALS   Goals to be met by: 4/18/24  Patient Goal Patient's self-stated goal is: go home   Goal #1 Patient is able to demonstrate supine - sit EOB @ level: CGA   Goal #1   Current Status NT received in the commode chair   Goal #2 Patient is able to demonstrate transfers Sit to/from Stand at assistance level: SBA      Goal #2  Current Status  min A with the RW   Goal #3 Patient is able to ambulate 75 feet with assistive device at assistance level: SBA while maintaining LLE TTWB status   Goal #3   Current Status  SPT with the RW min A TTWB maintained    Goal #4 Pending progress should patient return home   Patient will negotiate 6 stairs/one curb w/ assistive device and Min A   Goal #4   Current Status  NT   Goal #5 Patient verbalizes and/or demonstrates all precautions and safety concerns independently   Goal #5   Current Status  Ongoing   Goal #6 Patient independently performs home exercise program for ROM/strengthening per the instructions provided in preparation for discharge.   Goal #6  Current Status  Ongoing       Therapeutic Activity: 15 minutes             Occupational Therapy Notes (last 72 hours)  Notes from 4/6/2024 10:10 AM through 4/9/2024 10:10 AM   No notes of this type exist for this encounter.     Video Swallow Study Notes    No notes of this type exist for this encounter.     SLP Notes    No notes of this type exist for this encounter.     Immunizations     Name Date      Covid-19 Moderna 05/17/22     Covid-19 Moderna 10/26/21     Covid-19 Moderna 03/04/21     Covid-19 Moderna 02/04/21     Pfizer Covid-19 Vaccine 30mcg/0.3mL 12yrs+ 10/09/23       Multidisciplinary Problems     Active Goals     Not on file           Resolved Goals        Problem: Patient/Family Goals    Goal Priority Disciplines Outcome Interventions   Patient/Family Long Term Goal   (Resolved)     Interdisciplinary Adequate for Discharge    Description: Patient's Long Term Goal: To be discharged    Interventions:  - get clearance   - See additional Care Plan goals for specific interventions   Patient/Family Short Term Goal   (Resolved)     Interdisciplinary Adequate for Discharge    Description: Patient's Short Term Goal: Manage pain    Interventions:   - monitor and assess pain   - See additional Care Plan goals for specific interventions